# Patient Record
Sex: MALE | Race: BLACK OR AFRICAN AMERICAN | Employment: OTHER | URBAN - METROPOLITAN AREA
[De-identification: names, ages, dates, MRNs, and addresses within clinical notes are randomized per-mention and may not be internally consistent; named-entity substitution may affect disease eponyms.]

---

## 2019-01-16 ENCOUNTER — APPOINTMENT (OUTPATIENT)
Dept: LAB | Facility: HOSPITAL | Age: 49
End: 2019-01-16
Attending: INTERNAL MEDICINE
Payer: MEDICAID

## 2019-01-16 DIAGNOSIS — E11.9 TYPE 2 DIABETES MELLITUS WITHOUT COMPLICATION, WITHOUT LONG-TERM CURRENT USE OF INSULIN (HCC): ICD-10-CM

## 2019-01-16 DIAGNOSIS — I10 ESSENTIAL HYPERTENSION: ICD-10-CM

## 2019-01-16 DIAGNOSIS — Z12.5 SPECIAL SCREENING FOR MALIGNANT NEOPLASM OF PROSTATE: ICD-10-CM

## 2019-01-16 LAB
ALBUMIN SERPL BCP-MCNC: 4.4 G/DL (ref 3.5–4.8)
ALBUMIN/GLOB SERPL: 1.3 {RATIO} (ref 1–2)
ALP SERPL-CCNC: 59 U/L (ref 32–100)
ALT SERPL-CCNC: 38 U/L (ref 17–63)
ANION GAP SERPL CALC-SCNC: 10 MMOL/L (ref 0–18)
AST SERPL-CCNC: 33 U/L (ref 15–41)
BILIRUB SERPL-MCNC: 0.9 MG/DL (ref 0.3–1.2)
BUN SERPL-MCNC: 9 MG/DL (ref 8–20)
BUN/CREAT SERPL: 7.8 (ref 10–20)
CALCIUM SERPL-MCNC: 9.7 MG/DL (ref 8.5–10.5)
CHLORIDE SERPL-SCNC: 104 MMOL/L (ref 95–110)
CHOLEST SERPL-MCNC: 169 MG/DL (ref 110–200)
CO2 SERPL-SCNC: 25 MMOL/L (ref 22–32)
CREAT SERPL-MCNC: 1.15 MG/DL (ref 0.5–1.5)
EST. AVERAGE GLUCOSE BLD GHB EST-MCNC: 123 MG/DL (ref 68–126)
GLOBULIN PLAS-MCNC: 3.3 G/DL (ref 2.5–3.7)
GLUCOSE SERPL-MCNC: 122 MG/DL (ref 70–99)
HBA1C MFR BLD HPLC: 5.9 % (ref ?–5.7)
HDLC SERPL-MCNC: 38 MG/DL
LDLC SERPL CALC-MCNC: 116 MG/DL (ref 0–99)
NONHDLC SERPL-MCNC: 131 MG/DL
OSMOLALITY UR CALC.SUM OF ELEC: 288 MOSM/KG (ref 275–295)
PATIENT FASTING: YES
POTASSIUM SERPL-SCNC: 4 MMOL/L (ref 3.3–5.1)
PROT SERPL-MCNC: 7.7 G/DL (ref 5.9–8.4)
PSA SERPL-MCNC: 3.1 NG/ML (ref 0–4)
PSA SERPL-MCNC: 3.4 NG/ML (ref 0.01–4)
SODIUM SERPL-SCNC: 139 MMOL/L (ref 136–144)
TRIGL SERPL-MCNC: 77 MG/DL (ref 1–149)

## 2019-01-16 PROCEDURE — 84153 ASSAY OF PSA TOTAL: CPT

## 2019-01-16 PROCEDURE — 80053 COMPREHEN METABOLIC PANEL: CPT

## 2019-01-16 PROCEDURE — 36415 COLL VENOUS BLD VENIPUNCTURE: CPT

## 2019-01-16 PROCEDURE — 83036 HEMOGLOBIN GLYCOSYLATED A1C: CPT

## 2019-01-16 PROCEDURE — 80061 LIPID PANEL: CPT

## 2019-11-26 PROBLEM — M25.561 CHRONIC PAIN OF RIGHT KNEE: Status: ACTIVE | Noted: 2019-11-26

## 2019-11-26 PROBLEM — G89.29 CHRONIC PAIN OF RIGHT KNEE: Status: ACTIVE | Noted: 2019-11-26

## 2020-06-20 RX ORDER — ASCORBIC ACID 500 MG
500 TABLET ORAL DAILY
COMMUNITY

## 2020-06-22 ENCOUNTER — LAB ENCOUNTER (OUTPATIENT)
Dept: LAB | Facility: HOSPITAL | Age: 50
End: 2020-06-22
Attending: ORTHOPAEDIC SURGERY
Payer: MEDICAID

## 2020-06-22 DIAGNOSIS — Z01.818 PREOPERATIVE TESTING: ICD-10-CM

## 2020-06-24 ENCOUNTER — HOSPITAL ENCOUNTER (OUTPATIENT)
Facility: HOSPITAL | Age: 50
Setting detail: HOSPITAL OUTPATIENT SURGERY
Discharge: HOME | End: 2020-06-24
Attending: ORTHOPAEDIC SURGERY | Admitting: ORTHOPAEDIC SURGERY
Payer: MEDICAID

## 2020-06-24 VITALS
TEMPERATURE: 98 F | RESPIRATION RATE: 15 BRPM | DIASTOLIC BLOOD PRESSURE: 83 MMHG | WEIGHT: 240.63 LBS | OXYGEN SATURATION: 98 % | HEIGHT: 74 IN | SYSTOLIC BLOOD PRESSURE: 143 MMHG | BODY MASS INDEX: 30.88 KG/M2 | HEART RATE: 75 BPM

## 2020-06-24 DIAGNOSIS — S83.281A ACUTE LATERAL MENISCUS TEAR OF RIGHT KNEE, INITIAL ENCOUNTER: ICD-10-CM

## 2020-06-24 DIAGNOSIS — S83.241A ACUTE MEDIAL MENISCUS TEAR OF RIGHT KNEE, INITIAL ENCOUNTER: ICD-10-CM

## 2020-06-24 DIAGNOSIS — Z01.818 PREOPERATIVE TESTING: Primary | ICD-10-CM

## 2020-06-24 PROCEDURE — 82962 GLUCOSE BLOOD TEST: CPT

## 2020-06-24 RX ORDER — DEXTROSE MONOHYDRATE 25 G/50ML
50 INJECTION, SOLUTION INTRAVENOUS
Status: DISCONTINUED | OUTPATIENT
Start: 2020-06-24 | End: 2020-06-24

## 2020-06-24 RX ORDER — METOCLOPRAMIDE 10 MG/1
10 TABLET ORAL ONCE
Status: COMPLETED | OUTPATIENT
Start: 2020-06-24 | End: 2020-06-24

## 2020-06-24 RX ORDER — DEXTROSE MONOHYDRATE 50 MG/ML
INJECTION, SOLUTION INTRAVENOUS CONTINUOUS
Status: DISCONTINUED | OUTPATIENT
Start: 2020-06-24 | End: 2020-06-24

## 2020-06-24 RX ORDER — INSULIN ASPART 100 [IU]/ML
INJECTION, SOLUTION INTRAVENOUS; SUBCUTANEOUS ONCE
Status: DISCONTINUED | OUTPATIENT
Start: 2020-06-24 | End: 2020-06-24

## 2020-06-24 RX ORDER — FAMOTIDINE 20 MG/1
20 TABLET ORAL ONCE
Status: COMPLETED | OUTPATIENT
Start: 2020-06-24 | End: 2020-06-24

## 2020-06-24 RX ORDER — ACETAMINOPHEN 500 MG
1000 TABLET ORAL ONCE
Status: COMPLETED | OUTPATIENT
Start: 2020-06-24 | End: 2020-06-24

## 2020-06-24 RX ORDER — SODIUM CHLORIDE, SODIUM LACTATE, POTASSIUM CHLORIDE, CALCIUM CHLORIDE 600; 310; 30; 20 MG/100ML; MG/100ML; MG/100ML; MG/100ML
INJECTION, SOLUTION INTRAVENOUS CONTINUOUS
Status: DISCONTINUED | OUTPATIENT
Start: 2020-06-24 | End: 2020-06-24

## 2020-06-24 RX ORDER — CEFAZOLIN SODIUM/WATER 2 G/20 ML
2 SYRINGE (ML) INTRAVENOUS ONCE
Status: DISCONTINUED | OUTPATIENT
Start: 2020-06-24 | End: 2020-06-24

## 2020-06-24 NOTE — OR PREOP
BS= 258, rechecked 285. Patient states borderline diabetic, never given medications for diabetes. 08/2019 - A1C = 6.0. Dr Calderón Headings made aware. Call surgeon to see if he would like to proceed.  If so, given medium sliding scale
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
## 2020-06-27 ENCOUNTER — LAB ENCOUNTER (OUTPATIENT)
Dept: LAB | Facility: HOSPITAL | Age: 50
End: 2020-06-27
Attending: INTERNAL MEDICINE
Payer: MEDICAID

## 2020-06-27 DIAGNOSIS — Z13.220 ENCOUNTER FOR SCREENING FOR LIPID DISORDER: ICD-10-CM

## 2020-06-27 DIAGNOSIS — Z13.0 SCREENING FOR DISORDER OF BLOOD AND BLOOD-FORMING ORGANS: ICD-10-CM

## 2020-06-27 DIAGNOSIS — Z12.5 ENCOUNTER FOR SCREENING FOR MALIGNANT NEOPLASM OF PROSTATE: ICD-10-CM

## 2020-06-27 DIAGNOSIS — Z13.228 ENCOUNTER FOR SCREENING FOR METABOLIC DISORDER: ICD-10-CM

## 2020-06-27 DIAGNOSIS — Z13.29 ENCOUNTER FOR SCREENING FOR ENDOCRINE DISORDER: ICD-10-CM

## 2020-06-27 PROCEDURE — 84443 ASSAY THYROID STIM HORMONE: CPT

## 2020-06-27 PROCEDURE — 80053 COMPREHEN METABOLIC PANEL: CPT

## 2020-06-27 PROCEDURE — 36415 COLL VENOUS BLD VENIPUNCTURE: CPT

## 2020-06-27 PROCEDURE — 85025 COMPLETE CBC W/AUTO DIFF WBC: CPT

## 2020-06-27 PROCEDURE — 80061 LIPID PANEL: CPT

## 2020-06-27 PROCEDURE — 84153 ASSAY OF PSA TOTAL: CPT

## 2020-08-11 ENCOUNTER — LAB ENCOUNTER (OUTPATIENT)
Dept: LAB | Facility: HOSPITAL | Age: 50
End: 2020-08-11
Attending: ORTHOPAEDIC SURGERY
Payer: MEDICAID

## 2020-08-11 DIAGNOSIS — Z01.812 PRE-PROCEDURE LAB EXAM: Primary | ICD-10-CM

## 2020-08-11 LAB — SARS-COV-2 RNA RESP QL NAA+PROBE: NOT DETECTED

## 2020-08-12 ENCOUNTER — HOSPITAL ENCOUNTER (OUTPATIENT)
Facility: HOSPITAL | Age: 50
Setting detail: HOSPITAL OUTPATIENT SURGERY
Discharge: HOME | End: 2020-08-12
Attending: ORTHOPAEDIC SURGERY | Admitting: ORTHOPAEDIC SURGERY
Payer: MEDICAID

## 2020-08-12 ENCOUNTER — ANESTHESIA (OUTPATIENT)
Dept: SURGERY | Facility: HOSPITAL | Age: 50
End: 2020-08-12
Payer: MEDICAID

## 2020-08-12 ENCOUNTER — ANESTHESIA EVENT (OUTPATIENT)
Dept: SURGERY | Facility: HOSPITAL | Age: 50
End: 2020-08-12
Payer: MEDICAID

## 2020-08-12 VITALS
HEIGHT: 74 IN | OXYGEN SATURATION: 98 % | RESPIRATION RATE: 18 BRPM | WEIGHT: 235 LBS | SYSTOLIC BLOOD PRESSURE: 124 MMHG | HEART RATE: 71 BPM | DIASTOLIC BLOOD PRESSURE: 81 MMHG | BODY MASS INDEX: 30.16 KG/M2 | TEMPERATURE: 97 F

## 2020-08-12 DIAGNOSIS — S83.241A ACUTE MEDIAL MENISCUS TEAR OF RIGHT KNEE, INITIAL ENCOUNTER: ICD-10-CM

## 2020-08-12 DIAGNOSIS — S83.281A ACUTE LATERAL MENISCUS TEAR OF RIGHT KNEE, INITIAL ENCOUNTER: ICD-10-CM

## 2020-08-12 DIAGNOSIS — Z01.818 PRE-OP TESTING: Primary | ICD-10-CM

## 2020-08-12 LAB
GLUCOSE BLDC GLUCOMTR-MCNC: 111 MG/DL (ref 70–99)
GLUCOSE BLDC GLUCOMTR-MCNC: 114 MG/DL (ref 70–99)

## 2020-08-12 PROCEDURE — 94010 BREATHING CAPACITY TEST: CPT | Performed by: ORTHOPAEDIC SURGERY

## 2020-08-12 PROCEDURE — 82962 GLUCOSE BLOOD TEST: CPT

## 2020-08-12 PROCEDURE — 0SBC4ZZ EXCISION OF RIGHT KNEE JOINT, PERCUTANEOUS ENDOSCOPIC APPROACH: ICD-10-PCS | Performed by: ORTHOPAEDIC SURGERY

## 2020-08-12 RX ORDER — SODIUM CHLORIDE, SODIUM LACTATE, POTASSIUM CHLORIDE, CALCIUM CHLORIDE 600; 310; 30; 20 MG/100ML; MG/100ML; MG/100ML; MG/100ML
INJECTION, SOLUTION INTRAVENOUS CONTINUOUS
Status: DISCONTINUED | OUTPATIENT
Start: 2020-08-12 | End: 2020-08-12

## 2020-08-12 RX ORDER — ONDANSETRON 2 MG/ML
INJECTION INTRAMUSCULAR; INTRAVENOUS AS NEEDED
Status: DISCONTINUED | OUTPATIENT
Start: 2020-08-12 | End: 2020-08-12 | Stop reason: SURG

## 2020-08-12 RX ORDER — DEXTROSE MONOHYDRATE 25 G/50ML
50 INJECTION, SOLUTION INTRAVENOUS
Status: DISCONTINUED | OUTPATIENT
Start: 2020-08-12 | End: 2020-08-12

## 2020-08-12 RX ORDER — HYDROCODONE BITARTRATE AND ACETAMINOPHEN 5; 325 MG/1; MG/1
2 TABLET ORAL EVERY 4 HOURS PRN
Status: DISCONTINUED | OUTPATIENT
Start: 2020-08-12 | End: 2020-08-12

## 2020-08-12 RX ORDER — HYDROMORPHONE HYDROCHLORIDE 1 MG/ML
0.4 INJECTION, SOLUTION INTRAMUSCULAR; INTRAVENOUS; SUBCUTANEOUS EVERY 5 MIN PRN
Status: DISCONTINUED | OUTPATIENT
Start: 2020-08-12 | End: 2020-08-12

## 2020-08-12 RX ORDER — NALOXONE HYDROCHLORIDE 0.4 MG/ML
80 INJECTION, SOLUTION INTRAMUSCULAR; INTRAVENOUS; SUBCUTANEOUS AS NEEDED
Status: DISCONTINUED | OUTPATIENT
Start: 2020-08-12 | End: 2020-08-12

## 2020-08-12 RX ORDER — HYDROMORPHONE HYDROCHLORIDE 1 MG/ML
0.6 INJECTION, SOLUTION INTRAMUSCULAR; INTRAVENOUS; SUBCUTANEOUS EVERY 5 MIN PRN
Status: DISCONTINUED | OUTPATIENT
Start: 2020-08-12 | End: 2020-08-12

## 2020-08-12 RX ORDER — DEXAMETHASONE SODIUM PHOSPHATE 4 MG/ML
VIAL (ML) INJECTION AS NEEDED
Status: DISCONTINUED | OUTPATIENT
Start: 2020-08-12 | End: 2020-08-12 | Stop reason: SURG

## 2020-08-12 RX ORDER — HYDROCODONE BITARTRATE AND ACETAMINOPHEN 5; 325 MG/1; MG/1
2 TABLET ORAL AS NEEDED
Status: DISCONTINUED | OUTPATIENT
Start: 2020-08-12 | End: 2020-08-12

## 2020-08-12 RX ORDER — ACETAMINOPHEN 500 MG
1000 TABLET ORAL ONCE
Status: COMPLETED | OUTPATIENT
Start: 2020-08-12 | End: 2020-08-12

## 2020-08-12 RX ORDER — FAMOTIDINE 20 MG/1
20 TABLET ORAL ONCE
Status: COMPLETED | OUTPATIENT
Start: 2020-08-12 | End: 2020-08-12

## 2020-08-12 RX ORDER — MORPHINE SULFATE 10 MG/ML
6 INJECTION, SOLUTION INTRAMUSCULAR; INTRAVENOUS EVERY 10 MIN PRN
Status: DISCONTINUED | OUTPATIENT
Start: 2020-08-12 | End: 2020-08-12

## 2020-08-12 RX ORDER — CEFAZOLIN SODIUM/WATER 2 G/20 ML
2 SYRINGE (ML) INTRAVENOUS ONCE
Status: COMPLETED | OUTPATIENT
Start: 2020-08-12 | End: 2020-08-12

## 2020-08-12 RX ORDER — ONDANSETRON 2 MG/ML
4 INJECTION INTRAMUSCULAR; INTRAVENOUS ONCE AS NEEDED
Status: DISCONTINUED | OUTPATIENT
Start: 2020-08-12 | End: 2020-08-12

## 2020-08-12 RX ORDER — ONDANSETRON 2 MG/ML
4 INJECTION INTRAMUSCULAR; INTRAVENOUS EVERY 6 HOURS PRN
Status: DISCONTINUED | OUTPATIENT
Start: 2020-08-12 | End: 2020-08-12

## 2020-08-12 RX ORDER — BUPIVACAINE HYDROCHLORIDE AND EPINEPHRINE 2.5; 5 MG/ML; UG/ML
INJECTION, SOLUTION INFILTRATION; PERINEURAL AS NEEDED
Status: DISCONTINUED | OUTPATIENT
Start: 2020-08-12 | End: 2020-08-12 | Stop reason: HOSPADM

## 2020-08-12 RX ORDER — MORPHINE SULFATE 4 MG/ML
4 INJECTION, SOLUTION INTRAMUSCULAR; INTRAVENOUS EVERY 10 MIN PRN
Status: DISCONTINUED | OUTPATIENT
Start: 2020-08-12 | End: 2020-08-12

## 2020-08-12 RX ORDER — HYDROCODONE BITARTRATE AND ACETAMINOPHEN 5; 325 MG/1; MG/1
1 TABLET ORAL AS NEEDED
Status: DISCONTINUED | OUTPATIENT
Start: 2020-08-12 | End: 2020-08-12

## 2020-08-12 RX ORDER — ACETAMINOPHEN 325 MG/1
650 TABLET ORAL EVERY 4 HOURS PRN
Status: DISCONTINUED | OUTPATIENT
Start: 2020-08-12 | End: 2020-08-12

## 2020-08-12 RX ORDER — HALOPERIDOL 5 MG/ML
0.25 INJECTION INTRAMUSCULAR ONCE AS NEEDED
Status: DISCONTINUED | OUTPATIENT
Start: 2020-08-12 | End: 2020-08-12

## 2020-08-12 RX ORDER — HYDROMORPHONE HYDROCHLORIDE 1 MG/ML
0.2 INJECTION, SOLUTION INTRAMUSCULAR; INTRAVENOUS; SUBCUTANEOUS EVERY 5 MIN PRN
Status: DISCONTINUED | OUTPATIENT
Start: 2020-08-12 | End: 2020-08-12

## 2020-08-12 RX ORDER — HYDROCODONE BITARTRATE AND ACETAMINOPHEN 5; 325 MG/1; MG/1
1 TABLET ORAL EVERY 4 HOURS PRN
Status: DISCONTINUED | OUTPATIENT
Start: 2020-08-12 | End: 2020-08-12

## 2020-08-12 RX ORDER — HYDROCODONE BITARTRATE AND ACETAMINOPHEN 5; 325 MG/1; MG/1
1 TABLET ORAL EVERY 6 HOURS PRN
Qty: 10 TABLET | Refills: 0 | Status: SHIPPED | OUTPATIENT
Start: 2020-08-12 | End: 2021-03-10 | Stop reason: ALTCHOICE

## 2020-08-12 RX ORDER — MORPHINE SULFATE 4 MG/ML
2 INJECTION, SOLUTION INTRAMUSCULAR; INTRAVENOUS EVERY 10 MIN PRN
Status: DISCONTINUED | OUTPATIENT
Start: 2020-08-12 | End: 2020-08-12

## 2020-08-12 RX ORDER — ASPIRIN 325 MG
325 TABLET ORAL DAILY
Qty: 14 TABLET | Refills: 0 | Status: SHIPPED | OUTPATIENT
Start: 2020-08-12

## 2020-08-12 RX ORDER — METOCLOPRAMIDE 10 MG/1
10 TABLET ORAL ONCE
Status: COMPLETED | OUTPATIENT
Start: 2020-08-12 | End: 2020-08-12

## 2020-08-12 RX ORDER — PROCHLORPERAZINE EDISYLATE 5 MG/ML
5 INJECTION INTRAMUSCULAR; INTRAVENOUS ONCE AS NEEDED
Status: DISCONTINUED | OUTPATIENT
Start: 2020-08-12 | End: 2020-08-12

## 2020-08-12 RX ADMIN — ONDANSETRON 4 MG: 2 INJECTION INTRAMUSCULAR; INTRAVENOUS at 13:03:00

## 2020-08-12 RX ADMIN — DEXAMETHASONE SODIUM PHOSPHATE 4 MG: 4 MG/ML VIAL (ML) INJECTION at 13:05:00

## 2020-08-12 RX ADMIN — SODIUM CHLORIDE, SODIUM LACTATE, POTASSIUM CHLORIDE, CALCIUM CHLORIDE: 600; 310; 30; 20 INJECTION, SOLUTION INTRAVENOUS at 13:20:00

## 2020-08-12 RX ADMIN — CEFAZOLIN SODIUM/WATER 2 G: 2 G/20 ML SYRINGE (ML) INTRAVENOUS at 12:43:00

## 2020-08-12 NOTE — ANESTHESIA POSTPROCEDURE EVALUATION
Patient: Gregg Fragoso    Procedure Summary     Date:  08/12/20 Room / Location:  [facility] OR  / [facility] OR    Anesthesia Start:  8818 Anesthesia Stop:  8159    Procedure:  KNEE ARTHROSCOPY (Right Knee) Diagnosis:       Acute medial meniscus tear

## 2020-08-12 NOTE — ANESTHESIA PROCEDURE NOTES
Airway  Urgency: Elective      General Information and Staff    Patient location during procedure: OR  Anesthesiologist: Xochitl Bueno MD  Performed: anesthesiologist     Indications and Patient Condition  Indications for airway management: anesthesia

## 2020-08-12 NOTE — H&P
Kel Leach MD   Internal Medicine   Type 1 [facility] mellitus [facility] +4 more   Dx   Pre-Op Exam     Reason for Visit           Reason for Visit     Pre-Op Exam right knee scope   Progress Notes           HPI:      Patient pr • cloNIDine HCl 0.1 MG Oral Tab Take 1 tablet (0.1 mg total) by mouth daily.  90 tablet 3   • VENTOLIN  (90 Base) MCG/ACT Inhalation Aero Soln INHALE 1-2 PUFFS BY MOUTH ONCE EVERY 6 HOURS AS NEEDED FOR WHEEZING OR SHORTNESS OF BREATH 18 g 0   • Irbes HEENT: denies congestion, sore throat, swallowing difficulties  LUNGS: denies resting nor exertional dyspnea, wheezing, cough  CARDIOVASCULAR: denies chest pain, palpitations, orthopnea/ PND.   GI: denies nausea nor vomiting, abdominal pain, altered bowel h Abnormal EKG with left atrial enlargement. None noted on 2014 echo. No need for further testing.     Hypertension, controlled. Take BP pills morning of surgery with sip of H2O.     This consult was sent back the referring physician, Dr. Risa Craig.

## 2020-08-12 NOTE — ANESTHESIA PREPROCEDURE EVALUATION
Anesthesia PreOp Note    HPI:     Kaylynn Pena is a 45year old male who presents for preoperative consultation requested by: Boni Eubanks MD    Date of Surgery: 8/12/2020    Procedure(s):  KNEE ARTHROSCOPY  Indication: Acute medial meniscu 8/12/2020 at Unknown time  Glucose Blood (ONETOUCH ULTRA) In Vitro Strip, Test Blood Sugar Three Times Daily.  Insulin Dependent Diabetes Type II. Z79.4, E11.9., Disp: 300 each, Rfl: 3, 8/12/2020 at Unknown time  metFORMIN HCl 500 MG Oral Tab, Take 1 tablet a month at Unknown time  Montelukast Sodium (SINGULAIR) 10 MG Oral Tab, Take 1 tablet (10 mg total) by mouth daily. , Disp: 30 tablet, Rfl: 12, Unknown at Unknown time  FLUTICASONE PROPIONATE 50 MCG/ACT Nasal Suspension, USE 2 SPRAYS IN EACH NOSTRIL DAILY, Fear of current or ex partner: Not on file        Emotionally abused: Not on file        Physically abused: Not on file        Forced sexual activity: Not on file    Other Topics      Concerns:        Not on file    Social History Narrative      Not on josette member of the nature of the anesthetic plan, benefits, risks including possible dental damage if relevant, major complications, and any alternative forms of anesthetic management. All of the patient's questions were answered to the best of my ability.  Joselyn Harrington

## 2020-08-12 NOTE — BRIEF OP NOTE
Pre-Operative Diagnosis: Acute medial meniscus tear of right knee, initial encounter [S83.241A]  Acute lateral meniscus tear of right knee, initial encounter [S83.281A]     Post-Operative Diagnosis: Acute medial meniscus tear of right knee, initial encount

## 2020-08-12 NOTE — OPERATIVE REPORT
[facility]    PATIENT'S NAME: DEMETRIA IRENA LARSON   ATTENDING PHYSICIAN: Deepti Brooks MD   OPERATING PHYSICIAN: Deepti Brooks MD   PATIENT ACCOUNT#:   [id number]    LOCATION:  [address]  MEDICAL RECORD #:   [id number] the lateral aspect of the trochlea. The patient also had no loose bodies in the medial or lateral gutter. The medial compartment was entered and there was a degenerative tear of the posterior horn and body of the medial meniscus.   This was debrided back

## 2020-10-02 NOTE — LETTER
AUTHORIZATION FOR SURGICAL OPERATION OR OTHER PROCEDURE    1. I hereby authorize SIOMARA Nichole, and [facility] staff assigned to my case to perform the following operation and/or procedure at the [facility]:    ___________Cortisone Injection Left knee______________________________      _______________________________________________________________________________________________    2. My physician has explained the nature and purpose of the operation or other procedure, possible alternative methods of treatment, the risks involved, and the possibility of complication to me. I acknowledge that no guarantee has been made as to the result that may be obtained. 3.  I recognize that, during the course of this operation, or other procedure, unforseen conditions may necessitate additional or different procedure than those listed above. I, therefore, further authorize and request that the above named physician, his/her physician assistants or designees perform such procedures as are, in his/her professional opinion, necessary and desirable. 4.  Any tissue or organs removed in the operation or other procedure may be disposed of by and at the discretion of the [facility] and [facility]. 5.  I understand that in the event of a medical emergency, I will be transported by local paramedics to [facility] or other hospital emergency department. 6.  I certify that I have read and fully understand the above consent to operation and/or other procedure. 7.  I acknowledge that my physician has explained sedation/analgesia administration to me including the risks and benefits. I consent to the administration of sedation/analgesia as may be necessary or desirable in the judgement of my physician. Witness signature: ___________________________________________________ Date:  ______/______/_____                    Time:  ________ A. M.  P.M.        Patient Name:  ______________________________________________________  (please print)      Patient signature:  ___________________________________________________             Relationship to Patient:           []  Parent    Responsible person                          []  Spouse  In case of minor or                    [] Other  _____________   Incompetent name:  __________________________________________________                               (please print)      _____________      Responsible person  In case of minor or  Incompetent signature:  _______________________________________________    Statement of Physician  My signature below affirms that prior to the time of the procedure, I have explained to the patient and/or his/her guardian, the risks and benefits involved in the proposed treatment and any reasonable alternative to the proposed treatment. I have also explained the risks and benefits involved in the refusal of the proposed treatment and have answered the patient's questions.                         Date:  ______/______/_______  Provider                      Signature:  __________________________________________________________       Time:  ___________ A.M    P.M. done

## 2021-01-04 PROBLEM — M75.42 IMPINGEMENT SYNDROME OF LEFT SHOULDER: Status: ACTIVE | Noted: 2021-01-04

## 2021-03-10 ENCOUNTER — OFFICE VISIT (OUTPATIENT)
Dept: ORTHOPEDICS CLINIC | Facility: CLINIC | Age: 51
End: 2021-03-10
Payer: MEDICAID

## 2021-03-10 ENCOUNTER — HOSPITAL ENCOUNTER (OUTPATIENT)
Dept: GENERAL RADIOLOGY | Facility: HOSPITAL | Age: 51
Discharge: HOME | End: 2021-03-10
Attending: ORTHOPAEDIC SURGERY
Payer: MEDICAID

## 2021-03-10 VITALS
WEIGHT: 235 LBS | RESPIRATION RATE: 16 BRPM | SYSTOLIC BLOOD PRESSURE: 137 MMHG | DIASTOLIC BLOOD PRESSURE: 85 MMHG | HEART RATE: 73 BPM | BODY MASS INDEX: 30.16 KG/M2 | HEIGHT: 74 IN

## 2021-03-10 DIAGNOSIS — M17.12 PRIMARY OSTEOARTHRITIS OF LEFT KNEE: ICD-10-CM

## 2021-03-10 DIAGNOSIS — R52 PAIN: ICD-10-CM

## 2021-03-10 DIAGNOSIS — R52 PAIN: Primary | ICD-10-CM

## 2021-03-10 PROCEDURE — 3075F SYST BP GE 130 - 139MM HG: CPT | Performed by: ORTHOPAEDIC SURGERY

## 2021-03-10 PROCEDURE — 73564 X-RAY EXAM KNEE 4 OR MORE: CPT | Performed by: ORTHOPAEDIC SURGERY

## 2021-03-10 PROCEDURE — 3008F BODY MASS INDEX DOCD: CPT | Performed by: ORTHOPAEDIC SURGERY

## 2021-03-10 PROCEDURE — 20610 DRAIN/INJ JOINT/BURSA W/O US: CPT | Performed by: ORTHOPAEDIC SURGERY

## 2021-03-10 PROCEDURE — 99244 OFF/OP CNSLTJ NEW/EST MOD 40: CPT | Performed by: ORTHOPAEDIC SURGERY

## 2021-03-10 PROCEDURE — 3079F DIAST BP 80-89 MM HG: CPT | Performed by: ORTHOPAEDIC SURGERY

## 2021-03-10 RX ORDER — MELOXICAM 15 MG/1
15 TABLET ORAL DAILY
Qty: 30 TABLET | Refills: 0 | Status: SHIPPED | OUTPATIENT
Start: 2021-03-10 | End: 2021-12-06

## 2021-03-10 RX ORDER — TRIAMCINOLONE ACETONIDE 40 MG/ML
40 INJECTION, SUSPENSION INTRA-ARTICULAR; INTRAMUSCULAR ONCE
Status: COMPLETED | OUTPATIENT
Start: 2021-03-10 | End: 2021-03-10

## 2021-03-10 RX ADMIN — TRIAMCINOLONE ACETONIDE 40 MG: 40 INJECTION, SUSPENSION INTRA-ARTICULAR; INTRAMUSCULAR at 12:00:00

## 2021-03-10 NOTE — PROGRESS NOTES
NURSING INTAKE COMMENTS: Patient presents with:  Knee Pain: Right- pt states he had Right knee arthroscopy on 8/12/20 by Dr. Margarita Roy and has had an issue with swelling since. pt rates his pain 5/10, intermittent.       HPI: This 45year old male presents to Does not apply Misc Test Blood Sugar Three Times Daily. Insulin Dependent Diabetes Type II. Z79.4, E11.9. 300 each 3   • Blood Glucose Monitoring Suppl (ONETOUCH ULTRA 2) w/Device Does not apply Kit Test Blood Sugar Three Times Daily.  Insulin Dependent Demi file    Tobacco Use      Smoking status: Never Smoker      Smokeless tobacco: Never Used    Vaping Use      Vaping Use: Never used    Substance and Sexual Activity      Alcohol use: Not Currently      Drug use: No      Sexual activity: Not on file       Re injection 40 mg    Other orders  -     Meloxicam 15 MG Oral Tab; Take 1 tablet (15 mg total) by mouth daily. Assessment/plan: He has advanced osteoarthritis of the right knee with bone-on-bone in the medial compartment.   I reviewed surgical and nons

## 2021-03-10 NOTE — PROGRESS NOTES
Per verbal order from Dr. Tarun Phillip, draw up and 4ml of 0.5% Marcaine and 1ml of Kenalog 40 for injection into right knee. Lazaro Aguilar RN  Patient provided education handout for cortisone injection. Patient left office prior to obtaining post injection vitals.

## 2021-04-16 ENCOUNTER — PATIENT MESSAGE (OUTPATIENT)
Dept: ORTHOPEDICS CLINIC | Facility: CLINIC | Age: 51
End: 2021-04-16

## 2021-04-16 DIAGNOSIS — M17.11 PRIMARY OSTEOARTHRITIS OF RIGHT KNEE: Primary | ICD-10-CM

## 2021-04-20 NOTE — TELEPHONE ENCOUNTER
Virginie has been approved by [facility]. Authorization number [id number] is valid until 8/18/2021. Okay to schedule patient.

## 2021-04-21 ENCOUNTER — LAB ENCOUNTER (OUTPATIENT)
Dept: LAB | Facility: HOSPITAL | Age: 51
End: 2021-04-21
Attending: INTERNAL MEDICINE
Payer: MEDICAID

## 2021-04-21 DIAGNOSIS — Z01.818 PRE-OP TESTING: ICD-10-CM

## 2021-04-23 ENCOUNTER — HOSPITAL ENCOUNTER (OUTPATIENT)
Facility: HOSPITAL | Age: 51
Setting detail: HOSPITAL OUTPATIENT SURGERY
Discharge: HOME | End: 2021-04-23
Attending: INTERNAL MEDICINE | Admitting: INTERNAL MEDICINE
Payer: MEDICAID

## 2021-04-23 ENCOUNTER — ANESTHESIA EVENT (OUTPATIENT)
Dept: ENDOSCOPY | Facility: HOSPITAL | Age: 51
End: 2021-04-23
Payer: MEDICAID

## 2021-04-23 ENCOUNTER — ANESTHESIA (OUTPATIENT)
Dept: ENDOSCOPY | Facility: HOSPITAL | Age: 51
End: 2021-04-23
Payer: MEDICAID

## 2021-04-23 VITALS
DIASTOLIC BLOOD PRESSURE: 68 MMHG | HEIGHT: 74 IN | RESPIRATION RATE: 15 BRPM | BODY MASS INDEX: 30.16 KG/M2 | WEIGHT: 235 LBS | HEART RATE: 84 BPM | OXYGEN SATURATION: 96 % | SYSTOLIC BLOOD PRESSURE: 107 MMHG | TEMPERATURE: 98 F

## 2021-04-23 DIAGNOSIS — Z01.818 PRE-OP TESTING: Primary | ICD-10-CM

## 2021-04-23 DIAGNOSIS — Z80.0 FAMILY HISTORY OF MALIGNANT NEOPLASM OF GASTROINTESTINAL TRACT: ICD-10-CM

## 2021-04-23 DIAGNOSIS — Z12.11 SPECIAL SCREENING FOR MALIGNANT NEOPLASMS, COLON: ICD-10-CM

## 2021-04-23 PROCEDURE — 0DJD8ZZ INSPECTION OF LOWER INTESTINAL TRACT, VIA NATURAL OR ARTIFICIAL OPENING ENDOSCOPIC: ICD-10-PCS | Performed by: INTERNAL MEDICINE

## 2021-04-23 PROCEDURE — 82962 GLUCOSE BLOOD TEST: CPT

## 2021-04-23 RX ORDER — SODIUM CHLORIDE, SODIUM LACTATE, POTASSIUM CHLORIDE, CALCIUM CHLORIDE 600; 310; 30; 20 MG/100ML; MG/100ML; MG/100ML; MG/100ML
INJECTION, SOLUTION INTRAVENOUS CONTINUOUS
Status: DISCONTINUED | OUTPATIENT
Start: 2021-04-23 | End: 2021-04-23

## 2021-04-23 RX ORDER — SODIUM CHLORIDE 0.9 % (FLUSH) 0.9 %
10 SYRINGE (ML) INJECTION AS NEEDED
Status: DISCONTINUED | OUTPATIENT
Start: 2021-04-23 | End: 2021-04-23

## 2021-04-23 RX ORDER — MIDAZOLAM HYDROCHLORIDE 1 MG/ML
1 INJECTION INTRAMUSCULAR; INTRAVENOUS EVERY 5 MIN PRN
Status: DISCONTINUED | OUTPATIENT
Start: 2021-04-23 | End: 2021-04-23

## 2021-04-23 RX ORDER — SODIUM CHLORIDE, SODIUM LACTATE, POTASSIUM CHLORIDE, CALCIUM CHLORIDE 600; 310; 30; 20 MG/100ML; MG/100ML; MG/100ML; MG/100ML
INJECTION, SOLUTION INTRAVENOUS CONTINUOUS PRN
Status: DISCONTINUED | OUTPATIENT
Start: 2021-04-23 | End: 2021-04-23 | Stop reason: SURG

## 2021-04-23 RX ORDER — LIDOCAINE HYDROCHLORIDE 10 MG/ML
INJECTION, SOLUTION EPIDURAL; INFILTRATION; INTRACAUDAL; PERINEURAL AS NEEDED
Status: DISCONTINUED | OUTPATIENT
Start: 2021-04-23 | End: 2021-04-23 | Stop reason: SURG

## 2021-04-23 RX ADMIN — SODIUM CHLORIDE, SODIUM LACTATE, POTASSIUM CHLORIDE, CALCIUM CHLORIDE: 600; 310; 30; 20 INJECTION, SOLUTION INTRAVENOUS at 12:30:00

## 2021-04-23 RX ADMIN — LIDOCAINE HYDROCHLORIDE 25 MG: 10 INJECTION, SOLUTION EPIDURAL; INFILTRATION; INTRACAUDAL; PERINEURAL at 12:33:00

## 2021-04-23 RX ADMIN — SODIUM CHLORIDE, SODIUM LACTATE, POTASSIUM CHLORIDE, CALCIUM CHLORIDE: 600; 310; 30; 20 INJECTION, SOLUTION INTRAVENOUS at 12:43:00

## 2021-04-23 NOTE — ANESTHESIA PREPROCEDURE EVALUATION
Anesthesia PreOp Note    HPI:     Grazyna Bone is a 45year old male who presents for preoperative consultation requested by: Majo Madrigal MD    Date of Surgery: 4/23/2021    Procedure(s):  COLONOSCOPY  Indication: Special screening for malign 3  metFORMIN HCl 500 MG Oral Tab, Take 1 tablet (500 mg total) by mouth 2 (two) times daily. , Disp: 180 tablet, Rfl: 3  Vitamin C 500 MG Oral Tab, Take 500 mg by mouth daily. , Disp: , Rfl:   Cod Liver Oil 1000 MG Oral Cap, Take 1 capsule by mouth daily. , D ringers infusion, , Intravenous, Continuous, Corinne Shire, MD    No current [facility]-ordered outpatient medications on file.       No Known Allergies    Family History   Problem Relation Age of Onset   • Hypertension Father    • Other (Other) Mother were no vitals filed for this visit.      Anesthesia Evaluation     Patient summary reviewed and Nursing notes reviewed    Airway   Mallampati: II  TM distance: >3 FB  Neck ROM: full  Dental      Comment: Obvious signs of disease    Pulmonary - normal exam

## 2021-04-23 NOTE — OPERATIVE REPORT
COLONOSCOPY REPORT    Patient Name:  Shauna Zepeda Record #: [id number]  YOB: 1970  Date of Procedure: 4/23/2021    Referring physician: Lucia Nicolas MD    Surgeon:  Keagan Mcqueen.  Lindsay Goncalves MD    Pre-op diagnosis: Colon cancer lilibeth

## 2021-04-23 NOTE — ANESTHESIA POSTPROCEDURE EVALUATION
Patient: Jillian Swann    Procedure Summary     Date: 04/23/21 Room / Location: [facility] ENDOSCOPY 04 / [facility] ENDOSCOPY    Anesthesia Start: 1425 Anesthesia Stop: 5775    Procedure: COLONOSCOPY (N/A ) Diagnosis:       Special screening for malignant neopla

## 2021-04-23 NOTE — H&P
RE-PROCEDURE UPDATE    HPI: Agueda Diaz is a 45year old male. 7/7/1970. Patient presents for a colonoscopy. ALLERGIES: No Known Allergies    No current outpatient medications on file.      Past Medical History:   Diagnosis Date   • Asthma

## 2021-05-05 ENCOUNTER — TELEPHONE (OUTPATIENT)
Dept: ORTHOPEDICS CLINIC | Facility: CLINIC | Age: 51
End: 2021-05-05

## 2021-05-05 NOTE — TELEPHONE ENCOUNTER
Spoke with patient questions answered regarding injections. Please contact patient to set up an appointment for him to get his first injection.

## 2021-05-05 NOTE — TELEPHONE ENCOUNTER
Per pt received message that he has been approved for gel injection.  Per pt has swelling in his knee and asking if it can be drained on the same day or does he need to have it drained first.  Per pt how long after the injection does he need to stay off his

## 2021-06-02 ENCOUNTER — OFFICE VISIT (OUTPATIENT)
Dept: ORTHOPEDICS CLINIC | Facility: CLINIC | Age: 51
End: 2021-06-02
Payer: MEDICAID

## 2021-06-02 VITALS — SYSTOLIC BLOOD PRESSURE: 131 MMHG | DIASTOLIC BLOOD PRESSURE: 83 MMHG | HEART RATE: 71 BPM

## 2021-06-02 DIAGNOSIS — M17.11 PRIMARY OSTEOARTHRITIS OF RIGHT KNEE: Primary | ICD-10-CM

## 2021-06-02 PROCEDURE — 3079F DIAST BP 80-89 MM HG: CPT | Performed by: PHYSICIAN ASSISTANT

## 2021-06-02 PROCEDURE — 3075F SYST BP GE 130 - 139MM HG: CPT | Performed by: PHYSICIAN ASSISTANT

## 2021-06-02 PROCEDURE — 20610 DRAIN/INJ JOINT/BURSA W/O US: CPT | Performed by: PHYSICIAN ASSISTANT

## 2021-06-02 NOTE — PROGRESS NOTES
NURSING INTAKE COMMENTS: Patient presents with: Injection: Durolane injection to right knee- denies any pain at this office visit      HPI: This 45year old male presents today for follow-up on his right knee. He is here for a Durolane injection.   She ha Delica Lancets 01Z Does not apply Misc Test Blood Sugar Three Times Daily. Insulin Dependent Diabetes Type II. Z79.4, E11.9. 300 each 3   • Blood Glucose Monitoring Suppl (ONETOUCH ULTRA 2) w/Device Does not apply Kit Test Blood Sugar Three Times Daily.  In Drug use: No      Sexual activity: Not on file       Review of Systems:  GENERAL: feels generally well, no significant weight loss or weight gain  SKIN: no ulcerated or worrisome skin lesions  EYES:denies blurred vision or double vision  HEENT: denies new medical decision-making.

## 2022-04-12 ENCOUNTER — PATIENT MESSAGE (OUTPATIENT)
Dept: ORTHOPEDICS CLINIC | Facility: CLINIC | Age: 52
End: 2022-04-12

## 2022-04-29 ENCOUNTER — OFFICE VISIT (OUTPATIENT)
Dept: ORTHOPEDICS CLINIC | Facility: CLINIC | Age: 52
End: 2022-04-29
Payer: MEDICAID

## 2022-04-29 VITALS — HEART RATE: 92 BPM | SYSTOLIC BLOOD PRESSURE: 142 MMHG | DIASTOLIC BLOOD PRESSURE: 80 MMHG

## 2022-04-29 DIAGNOSIS — M17.11 PRIMARY OSTEOARTHRITIS OF RIGHT KNEE: Primary | ICD-10-CM

## 2022-04-29 PROCEDURE — 3079F DIAST BP 80-89 MM HG: CPT | Performed by: ORTHOPAEDIC SURGERY

## 2022-04-29 PROCEDURE — 99213 OFFICE O/P EST LOW 20 MIN: CPT | Performed by: ORTHOPAEDIC SURGERY

## 2022-04-29 PROCEDURE — 20610 DRAIN/INJ JOINT/BURSA W/O US: CPT | Performed by: PHYSICIAN ASSISTANT

## 2022-04-29 PROCEDURE — 3077F SYST BP >= 140 MM HG: CPT | Performed by: ORTHOPAEDIC SURGERY

## 2022-11-09 ENCOUNTER — APPOINTMENT (OUTPATIENT)
Dept: ULTRASOUND IMAGING | Facility: HOSPITAL | Age: 52
End: 2022-11-09
Attending: NURSE PRACTITIONER
Payer: MEDICAID

## 2022-11-09 ENCOUNTER — HOSPITAL ENCOUNTER (EMERGENCY)
Facility: HOSPITAL | Age: 52
Discharge: HOME | End: 2022-11-09
Payer: MEDICAID

## 2022-11-09 ENCOUNTER — APPOINTMENT (OUTPATIENT)
Dept: GENERAL RADIOLOGY | Facility: HOSPITAL | Age: 52
End: 2022-11-09
Payer: MEDICAID

## 2022-11-09 ENCOUNTER — TELEPHONE (OUTPATIENT)
Dept: ORTHOPEDICS CLINIC | Facility: CLINIC | Age: 52
End: 2022-11-09

## 2022-11-09 VITALS
SYSTOLIC BLOOD PRESSURE: 161 MMHG | OXYGEN SATURATION: 98 % | HEART RATE: 74 BPM | RESPIRATION RATE: 16 BRPM | DIASTOLIC BLOOD PRESSURE: 96 MMHG | TEMPERATURE: 99 F

## 2022-11-09 DIAGNOSIS — M25.562 ACUTE PAIN OF LEFT KNEE: Primary | ICD-10-CM

## 2022-11-09 PROCEDURE — 99284 EMERGENCY DEPT VISIT MOD MDM: CPT

## 2022-11-09 PROCEDURE — 93971 EXTREMITY STUDY: CPT | Performed by: NURSE PRACTITIONER

## 2022-11-09 PROCEDURE — 73560 X-RAY EXAM OF KNEE 1 OR 2: CPT

## 2022-11-09 RX ORDER — HYDROCODONE BITARTRATE AND ACETAMINOPHEN 5; 325 MG/1; MG/1
1 TABLET ORAL EVERY 8 HOURS PRN
Qty: 10 TABLET | Refills: 0 | Status: SHIPPED | OUTPATIENT
Start: 2022-11-09 | End: 2022-11-12

## 2022-11-09 NOTE — TELEPHONE ENCOUNTER
Per pt believes he tore his meniscus and has an appointment 12/12 and asking if he can be seen sooner.  Please advise

## 2022-11-09 NOTE — ED INITIAL ASSESSMENT (HPI)
Pt to ED for left knee pain since yesterday, states, \"I think I tore my meniscus. \" When asked if there was any trauma to the knee, pt said, \"I don't know, you tell me. \"

## 2022-11-09 NOTE — TELEPHONE ENCOUNTER
12/12/2022 is okay, but you can offer him an earlier appointment if there is a cancellation and a spot opens up sooner.

## 2022-11-09 NOTE — TELEPHONE ENCOUNTER
Patient reports feeling he may have torn left knee meniscus. States has been having pain on left knee for a couple weeks, but last night pain reached a 10/10. Reports pain is a \"12/10\" at this time. Notes swelling in the inner left side of the knee. States started limping yesterday and is becoming harder to bear weight on his left knee. States he had a right knee meniscus tear and feels exactly the same. Denies any numbness or tingling, calf pain, recent falls or injuries. Patient has an appointment on 12/12/22 and requesting a sooner appointment. Patient was advised to report to ER due to gracy pain level, swelling and decreased ability to bear weight on leg. Patient verbalized understanding and agreement. Please advise if any further recommendations and or if you would like to see patient sooner than 12/12. Thank you.

## 2022-11-10 ENCOUNTER — TELEPHONE (OUTPATIENT)
Dept: ORTHOPEDICS CLINIC | Facility: CLINIC | Age: 52
End: 2022-11-10

## 2022-11-10 NOTE — TELEPHONE ENCOUNTER
Patient requesting ER follow up. States he was told to follow up ASAP. NO appointments available.  Please advise

## 2022-11-11 NOTE — TELEPHONE ENCOUNTER
S/w pt and he states knee pain started on 11/8. No injury. Pt went to UC on 11/9 and had XR. Pt rates pain 7/10. appt made on 11/15 @ 3pm with Dr Nydia Horowitz, advised there may be a wait.

## 2022-11-15 ENCOUNTER — OFFICE VISIT (OUTPATIENT)
Dept: ORTHOPEDICS CLINIC | Facility: CLINIC | Age: 52
End: 2022-11-15
Payer: MEDICAID

## 2022-11-15 ENCOUNTER — TELEPHONE (OUTPATIENT)
Dept: ORTHOPEDICS CLINIC | Facility: CLINIC | Age: 52
End: 2022-11-15

## 2022-11-15 VITALS
BODY MASS INDEX: 30.16 KG/M2 | HEART RATE: 77 BPM | HEIGHT: 74 IN | WEIGHT: 235 LBS | DIASTOLIC BLOOD PRESSURE: 94 MMHG | SYSTOLIC BLOOD PRESSURE: 169 MMHG

## 2022-11-15 DIAGNOSIS — M25.562 ACUTE PAIN OF LEFT KNEE: ICD-10-CM

## 2022-11-15 DIAGNOSIS — M23.92 ACUTE INTERNAL DERANGEMENT OF LEFT KNEE: Primary | ICD-10-CM

## 2022-11-15 DIAGNOSIS — M17.12 PRIMARY OSTEOARTHRITIS OF LEFT KNEE: ICD-10-CM

## 2022-11-15 DIAGNOSIS — M17.11 PRIMARY OSTEOARTHRITIS OF RIGHT KNEE: Primary | ICD-10-CM

## 2022-11-15 PROCEDURE — 3077F SYST BP >= 140 MM HG: CPT

## 2022-11-15 PROCEDURE — 3080F DIAST BP >= 90 MM HG: CPT

## 2022-11-15 PROCEDURE — 20610 DRAIN/INJ JOINT/BURSA W/O US: CPT

## 2022-11-15 PROCEDURE — 3008F BODY MASS INDEX DOCD: CPT

## 2022-11-15 PROCEDURE — 99244 OFF/OP CNSLTJ NEW/EST MOD 40: CPT

## 2022-11-15 RX ORDER — HYALURONATE SODIUM, STABILIZED 60 MG/3 ML
60 SYRINGE (ML) INTRAARTICULAR
COMMUNITY
Start: 2022-04-29 | End: 2022-11-15 | Stop reason: ALTCHOICE

## 2022-11-15 RX ORDER — TRIAMCINOLONE ACETONIDE 40 MG/ML
40 INJECTION, SUSPENSION INTRA-ARTICULAR; INTRAMUSCULAR ONCE
Status: COMPLETED | OUTPATIENT
Start: 2022-11-15 | End: 2022-11-15

## 2022-11-15 RX ADMIN — TRIAMCINOLONE ACETONIDE 40 MG: 40 INJECTION, SUSPENSION INTRA-ARTICULAR; INTRAMUSCULAR at 15:38:00

## 2022-11-15 NOTE — PROGRESS NOTES
Per verbal order from SIOMARA Houser, draw up 5ml of 0.5% Marcaine and 1ml of Kenalog 40 for cortisone injection to left knee. Ai Robles RN  Patient provided education handout for cortisone injection.

## 2022-11-15 NOTE — TELEPHONE ENCOUNTER
Pt requesting Durolane injection right knee.  Last durolane given on 4/29/22  Please sign pending order to submit for approval Doxepin Pregnancy And Lactation Text: This medication is Pregnancy Category C and it isn't known if it is safe during pregnancy. It is also excreted in breast milk and breast feeding isn't recommended.

## 2022-11-21 NOTE — TELEPHONE ENCOUNTER
Repeat Durolane injection approved via [employer].. Auth # [id number] is valid until 5/20/2023. Okay to schedule.

## 2022-12-12 ENCOUNTER — OFFICE VISIT (OUTPATIENT)
Dept: ORTHOPEDICS CLINIC | Facility: CLINIC | Age: 52
End: 2022-12-12
Payer: MEDICAID

## 2022-12-12 VITALS — HEART RATE: 78 BPM | SYSTOLIC BLOOD PRESSURE: 149 MMHG | DIASTOLIC BLOOD PRESSURE: 84 MMHG

## 2022-12-12 DIAGNOSIS — M17.11 PRIMARY OSTEOARTHRITIS OF RIGHT KNEE: Primary | ICD-10-CM

## 2023-01-11 ENCOUNTER — HOSPITAL ENCOUNTER (EMERGENCY)
Facility: HOSPITAL | Age: 53
Discharge: HOME | End: 2023-01-11
Attending: EMERGENCY MEDICINE
Payer: MEDICAID

## 2023-01-11 ENCOUNTER — APPOINTMENT (OUTPATIENT)
Dept: CT IMAGING | Facility: HOSPITAL | Age: 53
End: 2023-01-11
Attending: EMERGENCY MEDICINE
Payer: MEDICAID

## 2023-01-11 VITALS
HEART RATE: 77 BPM | SYSTOLIC BLOOD PRESSURE: 159 MMHG | RESPIRATION RATE: 18 BRPM | TEMPERATURE: 98 F | BODY MASS INDEX: 29.52 KG/M2 | OXYGEN SATURATION: 98 % | DIASTOLIC BLOOD PRESSURE: 94 MMHG | WEIGHT: 230 LBS | HEIGHT: 74 IN

## 2023-01-11 DIAGNOSIS — J32.0 MAXILLARY SINUSITIS, UNSPECIFIED CHRONICITY: ICD-10-CM

## 2023-01-11 DIAGNOSIS — S09.8XXA BLUNT HEAD TRAUMA, INITIAL ENCOUNTER: Primary | ICD-10-CM

## 2023-01-11 DIAGNOSIS — I10 HYPERTENSION, UNSPECIFIED TYPE: ICD-10-CM

## 2023-01-11 PROCEDURE — 99284 EMERGENCY DEPT VISIT MOD MDM: CPT

## 2023-01-11 PROCEDURE — 70450 CT HEAD/BRAIN W/O DYE: CPT | Performed by: EMERGENCY MEDICINE
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
## 2023-02-07 ENCOUNTER — OFFICE VISIT (OUTPATIENT)
Dept: PHYSICAL THERAPY | Age: 53
End: 2023-02-07
Payer: MEDICAID

## 2023-02-07 DIAGNOSIS — M25.562 ACUTE PAIN OF LEFT KNEE: ICD-10-CM

## 2023-02-07 DIAGNOSIS — M23.92 ACUTE INTERNAL DERANGEMENT OF LEFT KNEE: ICD-10-CM

## 2023-02-07 DIAGNOSIS — M17.12 PRIMARY OSTEOARTHRITIS OF LEFT KNEE: ICD-10-CM

## 2023-02-07 PROCEDURE — 97110 THERAPEUTIC EXERCISES: CPT

## 2023-02-07 PROCEDURE — 97162 PT EVAL MOD COMPLEX 30 MIN: CPT

## 2023-02-09 ENCOUNTER — OFFICE VISIT (OUTPATIENT)
Dept: PHYSICAL THERAPY | Age: 53
End: 2023-02-09
Payer: MEDICAID

## 2023-02-09 PROCEDURE — 97140 MANUAL THERAPY 1/> REGIONS: CPT

## 2023-02-09 PROCEDURE — 97110 THERAPEUTIC EXERCISES: CPT

## 2023-02-09 NOTE — PROGRESS NOTES
Diagnosis:   Primary osteoarthritis of left knee (M17.12)  Acute pain of left knee (M25.562)  Acute internal derangement of left knee (M23.92)   Referring Provider: Vince Rainey  Date of Evaluation:    2/7/2023    Precautions:  None Next MD visit:   none scheduled  Date of Surgery: n/a   Insurance Primary/Secondary: Jodee Salamanca / N/A     # Auth Visits: POC 13 visits, 13 visits auth until 4/8/2023      Subjective: The patient reports his knee feels swollen today. He thinks the swelling could be due to the weather. He is ambulating with a cane today to provide support in case his knee casey. It has not buckled today but it has a history of buckling. He did not ice today. Yesterday he had very little pain at 4/10. Pain: 12/10  Current functional limitations include intermittent pain with sleeping,, standing less than 10 mins, walking more than 2 blocks, bending down, intermittent difficulty with stairs (step to step),  difficulty getting in/out sedan cars     Objective: See treatment log   Palpation: Mod edema across medial joint line of L knee       Assessment: The patient is tender to palpation along the medial knee joint line potentially due to the mod edema today. He reports stretching pain in quads and hamstrings with exercises. He required frequent breaks and was bending his knee to help decrease stiffness after extension exercises. He reports 12/10 pain with all exercises, patellar mobs and STM attempted for pain relief. Today's session focused on strength and mobility by progressing with straight leg raises and knee slides to improve mobility into flexion. He continues to demonstrate limitations with strength and exercise tolerance due to pain and edema. The patient will benefit from continuation of physical therapy services with focus on decreasing pain and improving function with  walking and standing activities.      Goals:   Goals: (to be met in 13 visits)  Patient is independent with progressive HEP to maintain gains made in PT   Patient is able to decrease max pain to 6/10 to decrease limitations with walking and standing  Patient is able to sleep 75% of the nights without knee pain to improve quality of life   Patient is able to improve L hip flexion MMT to 4+/5 to improve strength with walking and standing  Patient is able to reach 15s SLS stance on LLE to improve balance on uneven grounds   Patient is able to able to improve knee flexion range to 120 degrees to improve mobility with bending to  items from the ground    Plan: Attempt standing exercises to improve strength by adding standing heel raises and slant stretch to improve weight bearing support of the knee.    Date: 2/9/2023  TX#: 2/13 Date:                 TX#: 3/13 Date:                 TX#: 4/13   man Grade II patellar mob M/L x 5 min   STM grade II  at medial knee joint x 3      therex  NuStep level 1 x 3 mins   SLR with QS 2 x 5   SL clamshells 2 x 10 R/L  Seated LAQ 2 x 10   Supine marches 2 x 10   Seated hamstring curls 2 x 10 YTB   DKTC OSB 2 x 10   Heel slides with towel, board, DKSA strap 2 x 10   Quad sets with towel x 5            modalities ICE for 8 mins      HEP: Seated LAQ     Charges: 1 man (8 mins), 2 therex (35 mins)      Total Timed Treatment: 43 min  Total Treatment Time: 51 min

## 2023-02-13 ENCOUNTER — OFFICE VISIT (OUTPATIENT)
Dept: PHYSICAL THERAPY | Age: 53
End: 2023-02-13
Payer: MEDICAID

## 2023-02-13 PROCEDURE — 97110 THERAPEUTIC EXERCISES: CPT

## 2023-02-13 NOTE — PROGRESS NOTES
Diagnosis:   Primary osteoarthritis of left knee (M17.12)  Acute pain of left knee (M25.562)  Acute internal derangement of left knee (M23.92)   Referring Provider: Juno Drummond  Date of Evaluation:    2/7/2023    Precautions:  None Next MD visit:   none scheduled  Date of Surgery: n/a   Insurance Primary/Secondary: Barry Angel / N/A     # Auth Visits: POC 13 visits, 13 visits auth until 4/8/2023      Subjective: The patient reports he is not having as much pain in the knee this week as he had at the last session. He has been sitting for the last couple hours resting the knee. yesterday he was having pain with standing more than 5 minutes; this pain was at the anterior knee and into the calves. Pain: 5/10  Current functional limitations include intermittent pain with sleeping,, standing less than 10 mins, walking more than 2 blocks, bending down, intermittent difficulty with stairs (step to step),  difficulty getting in/out sedan cars     Objective: See treatment log     AROM 122 deg (113)       Assessment: Continued with stretches and strengthening this session to hep improve his knee support in CKC. He did have increased knee flexion range which will allow for better range for squatting, stairs and car transfers. He continues to need increased LE strength along with increased range of motion to improve his knee mechanics in CKC and allow him to decrease stresses at the anterior and medial knee in weight bearing.      Goals:   Goals: (to be met in 13 visits)  Patient is independent with progressive HEP to maintain gains made in PT   Patient is able to decrease max pain to 6/10 to decrease limitations with walking and standing  Patient is able to sleep 75% of the nights without knee pain to improve quality of life   Patient is able to improve L hip flexion MMT to 4+/5 to improve strength with walking and standing  Patient is able to reach 15s SLS stance on LLE to improve balance on uneven grounds   Patient is able to able to improve knee flexion range to 120 degrees to improve mobility with bending to  items from the ground    Plan: Continue with progressive range of motion and strengthening; progress with standing hip strengthening, heel raises and standing marches to improve strength and stability in weight bearing. Date: 2/9/2023  TX#: 2/13 Date:    2/13/2023             TX#: 3/13 Date:                 TX#: 4/13   man Grade II patellar mob M/L x 5 min   STM grade II  at medial knee joint x 3  Grade II patellar mob M/L x 3 min   STM grade II  at medial knee joint x 3     therex  NuStep level 1 x 3 mins   SLR with QS 2 x 5   SL clamshells 2 x 10 R/L  Seated LAQ 2 x 10   Supine marches 2 x 10   Seated hamstring curls 2 x 10 YTB   DKTC OSB 2 x 10   Heel slides with towel, board, DKSA strap 2 x 10   Quad sets with towel x 5  NuStep level 4 x 4 mins     Quad sets with towel x 5, 5\"  SLR with QS 1 x 5   Heel slides with towel, board, DKSA strap 1 x 10     SL clamshells x15 R/L    Seated hip adduction isometric x 15, 3\"    Seated LAQ 1 x 10   Seated clams 2 x 10 RTB  Seated marches x15 RTB  Seated knee flexion in chair x 10          NMR      modalities ICE for 8 mins  NA    HEP:  Not progressed this visit.     Charges: 3 ther ex (42 min()  Total Timed Treatment: 42 min  Total Treatment Time: 43 min

## 2023-02-15 ENCOUNTER — OFFICE VISIT (OUTPATIENT)
Dept: PHYSICAL THERAPY | Age: 53
End: 2023-02-15
Payer: MEDICAID

## 2023-02-15 PROCEDURE — 97110 THERAPEUTIC EXERCISES: CPT

## 2023-02-15 NOTE — PROGRESS NOTES
Diagnosis:   Primary osteoarthritis of left knee (M17.12)  Acute pain of left knee (M25.562)  Acute internal derangement of left knee (M23.92)   Referring Provider: Walton Hashimoto  Date of Evaluation:    2/7/2023    Precautions:  None Next MD visit:   none scheduled  Date of Surgery: n/a   Insurance Primary/Secondary: Amina Marsh / N/A     # Auth Visits: POC 13 visits, 13 visits auth until 4/8/2023      Subjective: The patient reports he is having more pain today and he thinks that his pain really increased with the change in weather overnight. He had difficulty with sleep last night. He was ok after the last session until last night. Most of the pain is located at the anterior knee in general today. Pain: 10/10  Current functional limitations include intermittent pain with sleeping, standing less than 10 mins, walking more than 2 blocks, bending down, intermittent difficulty with stairs (step to step),  difficulty getting in/out sedan cars     Objective: See treatment log     AROM 122 deg (122)       Assessment: Continued with strengthening and knee range of motion this session to improve his LE mechanics as needed to improve his gait pattern, transfers and endurance in CK. Continued with proximal hip strengthening and additional weight bearing exercises this session; he had no increased pain over baseline. He continues to need increased strength to help with his functional independence and to decrease stresses at the anterior and medial knee.     Goals:   Goals: (to be met in 13 visits)  Patient is independent with progressive HEP to maintain gains made in PT   Patient is able to decrease max pain to 6/10 to decrease limitations with walking and standing  Patient is able to sleep 75% of the nights without knee pain to improve quality of life   Patient is able to improve L hip flexion MMT to 4+/5 to improve strength with walking and standing  Patient is able to reach 15s SLS stance on LLE to improve balance on uneven grounds   Patient is able to able to improve knee flexion range to 120 degrees to improve mobility with bending to  items from the ground    Plan: Continue with progressive range of motion and strengthening; progress with marches on Airex for strength and balance and also progress with lateral walks to help improve his ability to walk with better mechanics and decreased pain medially.       Date: 2/9/2023  TX#: 2/13 Date:    2/13/2023             TX#: 3/13 Date: 2/15/2023                TX#: 4/13   man Grade II patellar mob M/L x 5 min   STM grade II  at medial knee joint x 3  Grade II patellar mob M/L x 3 min   STM grade II  at medial knee joint x 3 min Grade II patellar mob M/L x 3 min   STM grade II  at medial knee joint x 2 min    therex  NuStep level 1 x 3 mins   SLR with QS 2 x 5   SL clamshells 2 x 10 R/L  Seated LAQ 2 x 10   Supine marches 2 x 10   Seated hamstring curls 2 x 10 YTB   DKTC OSB 2 x 10   Heel slides with towel, board, DKSA strap 2 x 10   Quad sets with towel x 5  NuStep level 4 x 4 mins     Quad sets with towel x 5, 5\"  SLR with QS 1 x 5   Heel slides with towel, board, DKSA strap 1 x 10     SL clamshells x15 R/L    Seated hip adduction isometric x 15, 3\"    Seated LAQ 1 x 10   Seated clams 2 x 10 RTB  Seated marches x15 RTB  Seated knee flexion in chair x 10       NuStep level 4 x 5 mins     Quad sets with towel x 5, 5\"  SLR with QS 1 x 5   Heel slides with towel, board, DKSA strap 1 x 10     SL clamshells x20 R/L    Seated hip adduction isometric x 15, 3\"    Seated LAQ 1 x 10   Seated clams 2 x 10 RTB  Seated marches 2 x 10 RTB  Seated knee flexion in chair x 10 ND  Standing heel raises x 15  Slant stretch level 1, 1 min  standing hip abduction 1 x 10 R/L  Standing hip extension 1 x 10 R/L    NMR      modalities ICE for 8 mins  NA NA   HEP:  reviewed    Charges: 3 ther ex (38 min)  Total Timed Treatment: 38 min  Total Treatment Time: 40 min

## 2023-02-17 ENCOUNTER — TELEPHONE (OUTPATIENT)
Dept: PHYSICAL THERAPY | Facility: HOSPITAL | Age: 53
End: 2023-02-17

## 2023-02-21 ENCOUNTER — OFFICE VISIT (OUTPATIENT)
Dept: PHYSICAL THERAPY | Age: 53
End: 2023-02-21
Payer: MEDICAID

## 2023-02-21 PROCEDURE — 97110 THERAPEUTIC EXERCISES: CPT

## 2023-02-21 NOTE — PROGRESS NOTES
Diagnosis:   Primary osteoarthritis of left knee (M17.12)  Acute pain of left knee (M25.562)  Acute internal derangement of left knee (M23.92)   Referring Provider: Mitali Watson  Date of Evaluation:    2/7/2023    Precautions:  None Next MD visit:   none scheduled  Date of Surgery: n/a   Insurance Primary/Secondary: Estle Spurling / N/A     # Auth Visits: POC 13 visits, 13 visits auth until 4/8/2023      Subjective: The patient pain and burning in both knee joints at the anteromedial aspects causing him to wake up at 2 am, he was unable to return to sleep due to pain. He iced 20 minutes prior to PT session today. He used Ibuprofen 2 hours prior to the session and states it has not been helping with pain management at this time. He reports hip pain that \"comes and goes\". HEP has not been improving his pain. Pain: 20/10  Current functional limitations include intermittent pain with sleeping, standing less than 10 mins, walking more than 2 blocks, bending down, intermittent difficulty with stairs (step to step),  difficulty getting in/out sedan cars     Objective: See treatment log     Assessment: The patient was not having pain relief with manual techniques and the pain was exacerbated with therex. The patient experienced increased pain with extension and CKC activities due to compression on the knee joint reporting 23/10 pain. Bending his knee provided minimal pain relief between repetitions; he required multiple breaks throughout therex for pain management.  Will continue to work on strengthening and increasing ROM as tolerated next visit and plan to progress to HEP pending symptoms and response to treatment    Goals:   Goals: (to be met in 13 visits)  Patient is independent with progressive HEP to maintain gains made in PT   Patient is able to decrease max pain to 6/10 to decrease limitations with walking and standing  Patient is able to sleep 75% of the nights without knee pain to improve quality of life Patient is able to improve L hip flexion MMT to 4+/5 to improve strength with walking and standing  Patient is able to reach 15s SLS stance on LLE to improve balance on uneven grounds   Patient is able to able to improve knee flexion range to 120 degrees to improve mobility with bending to  items from the ground    Plan:  Progress ther ex and HEP with marches on airex and  standing TKEs as tolerated; plan to reassess patient progress next session to determine POC.     Date:    2/13/2023             TX#: 3/13 Date: 2/15/2023                TX#: 4/13 Date: 2/21/2023                TX#: 5/13   man Grade II patellar mob M/L x 3 min   STM grade II  at medial knee joint x 3 min Grade II patellar mob M/L x 3 min   STM grade II  at medial knee joint x 2 min  Grade II patellar mobs M/L x 1 min  STM grade II at medial knee joint x 1 min    therex  NuStep level 4 x 4 mins     Quad sets with towel x 5, 5\"  SLR with QS 1 x 5   Heel slides with towel, board, DKSA strap 1 x 10     SL clamshells x15 R/L    Seated hip adduction isometric x 15, 3\"    Seated LAQ 1 x 10   Seated clams 2 x 10 RTB  Seated marches x15 RTB  Seated knee flexion in chair x 10       NuStep level 4 x 5 mins     Quad sets with towel x 5, 5\"  SLR with QS 1 x 5   Heel slides with towel, board, DKSA strap 1 x 10     SL clamshells x20 R/L    Seated hip adduction isometric x 15, 3\"    Seated LAQ 1 x 10   Seated clams 2 x 10 RTB  Seated marches 2 x 10 RTB  Seated knee flexion in chair x 10 ND  Standing heel raises x 15  Slant stretch level 1, 1 min  standing hip abduction 1 x 10 R/L  Standing hip extension 1 x 10 R/L  NuStep level 4 x 5 mins     Quad sets with towel x 5, 5\"  Heel slides with towel, board, DKSA strap 1 x 10   SLR with QS 1 x 5   SL clamshells x20 L    Seated marches 2 x 10 RTB  Seated clams 2 x 10 RTB  Seated hip adduction isometric x 15, 5\"    Slant stretch level 1, 1 min  Standing heel raises x 10    NMR      modalities NA NA Ice for 10 mins HEP: Reviewed    Charges: 3 ther ex (38 min)  Total Timed Treatment: 40 min  Total Treatment Time: 50 min

## 2023-02-23 ENCOUNTER — APPOINTMENT (OUTPATIENT)
Dept: PHYSICAL THERAPY | Age: 53
End: 2023-02-23
Payer: MEDICAID

## 2023-02-24 ENCOUNTER — OFFICE VISIT (OUTPATIENT)
Dept: PHYSICAL THERAPY | Age: 53
End: 2023-02-24
Payer: MEDICAID

## 2023-02-24 PROCEDURE — 97110 THERAPEUTIC EXERCISES: CPT

## 2023-02-24 NOTE — PROGRESS NOTES
Eric  Pt has attended 6 visits in Physical Therapy. Diagnosis:   Primary osteoarthritis of left knee (M17.12)  Acute pain of left knee (M25.562)  Acute internal derangement of left knee (M23.92)   Referring Provider: Gisela Melendez  Date of Evaluation:    2/7/2023    Precautions:  None Next MD visit:   none scheduled  Date of Surgery: n/a   Insurance Primary/Secondary: Anderson Hansen / N/A     # Auth Visits: POC 13 visits, 13 visits auth until 4/8/2023      Subjective: The patient reports he has been using a brace to prevent buckling since Wednesday. He iced and used Tylenol for pain management yesterday. Regarding current functional limitations, the pain continues to wake him up at night and he is unable to stand longer than 10 minutes without needing a seated rest break. His left knee hurts with walking in small grocery stores and believes he is unable to walk 2 blocks. He uses step-to pattern with stair negotiation and uses railings when able. Getting in/out of sedan cars continues to be difficult due to painful flexion. He states he had a good experience in therapy but his pain levels have remained the same since starting PT. Pain: 10/10  Current functional limitations include intermittent pain with sleeping, standing less than 10 mins, walking more than 2 blocks, bending down, intermittent difficulty with stairs (step to step), difficulty getting in/out sedan cars     Objective: See treatment log   Palpation: mod edema on the medial left knee, TTP on left medial joint line. Min edema on the medial right knee. TTP on right lateral joint line.   Patellar mobility:  No deficits in patellar mobility in left knee     AROM: (* denotes performed with pain)  Knee   Flexion: R 120*; L 113* (113)  Extension: R 3; L  (0) 6*         Flexibility:  Hamstrings: R -23 deg ; L -17 deg    Strength/MMT: (* denotes performed with pain)  Hip Knee Foot/Ankle   Flexion: R 4*/5; L 4*/5  Extension: R 3*/5; L 3+*/5  Abduction: R 4-/5; L 3+*/5  ER: R 4*/5; L 4-*/5  IR: R 4/5; L 4*/5    Low abdominal MMT: 1/5 Flexion: R 4/5; L 4*/5  Extension: R 5/5; L 4+*/5    DF: R 5/5; L 5/5  PF: R 5/5; L 5/5     Special tests:   Desiree's: L: positive for medial mensicus     Gait: pt ambulates on level ground with antalgia, bilateral trendelenburg/waddle, stiff knee gait pattern, R lateral lean, wide BERTHA   Balance: SLS: R 3 sec, L 9 sec    Limited with participation this session due to feeling ill; focused on reassessment. Assessment: The patient has made gains in extension ROM and in BLE strength; however, he has not progressed with any of his activity limitations due to medial knee pain. Pain is preventing him from making gains with gait mechanics and endurance activities. The pain tends to increase with activities promoting meniscal compression. Edema across the medial joint line could also be contributing to the patient's pain and range limitations. The patient is independent with progressive HEP.  The patient will discharge as he has not made any significant gains in skilled physical therapy and needs additional follow up with MD.    Goals:   Goals: (to be met in 13 visits)  Patient is independent with progressive HEP to maintain gains made in PT MET  Patient is able to decrease max pain to 6/10 to decrease limitations with walking and standing WORKING TOWARD  Patient is able to sleep 75% of the nights without knee pain to improve quality of life WORKING TOWARD   Patient is able to improve L hip flexion MMT to 4+/5 to improve strength with walking and standing WORKING TOWARD   Patient is able to reach 15s SLS stance on LLE to improve balance on uneven grounds WORKING TOWARD   Patient is able to able to improve knee flexion range to 120 degrees to improve mobility with bending to  items from the ground WORKING TOWARD     Post LEFS Score  Post LEFS Score: 21.25 % (2/21/2023  3:21 PM)    -1.25 % improvement    Plan: Discharge from skilled PT with HEP. Patient was instructed to follow up with his MD regarding additional care for pain management. Patient/Family/Caregiver was advised of these findings, precautions, and treatment options and has agreed to actively participate in planning and for this course of care. Thank you for your referral. If you have any questions, please contact me at Dept: [phone]. Sincerely,  Electronically signed by therapist: Nehal Ballard PT     Physician's certification required:  Yes  Please co-sign or sign and return this letter via fax as soon as possible to [phone]. I certify the need for these services furnished under this plan of treatment and while under my care.     X___________________________________________________ Date____________________    Certification From: 9/29/2535  To:5/25/2023       Date:    2/13/2023             TX#: 3/13 Date: 2/15/2023                TX#: 4/13 Date: 2/21/2023                TX#: 5/13 Date: 2/24/2023                TX#: 6/13   man Grade II patellar mob M/L x 3 min   STM grade II  at medial knee joint x 3 min Grade II patellar mob M/L x 3 min   STM grade II  at medial knee joint x 2 min  Grade II patellar mobs M/L x 1 min  STM grade II at medial knee joint x 1 min     therex  NuStep level 4 x 4 mins     Quad sets with towel x 5, 5\"  SLR with QS 1 x 5   Heel slides with towel, board, DKSA strap 1 x 10     SL clamshells x15 R/L    Seated hip adduction isometric x 15, 3\"    Seated LAQ 1 x 10   Seated clams 2 x 10 RTB  Seated marches x15 RTB  Seated knee flexion in chair x 10       NuStep level 4 x 5 mins     Quad sets with towel x 5, 5\"  SLR with QS 1 x 5   Heel slides with towel, board, DKSA strap 1 x 10     SL clamshells x20 R/L    Seated hip adduction isometric x 15, 3\"    Seated LAQ 1 x 10   Seated clams 2 x 10 RTB  Seated marches 2 x 10 RTB  Seated knee flexion in chair x 10 ND  Standing heel raises x 15  Slant stretch level 1, 1 min  standing hip abduction 1 x 10 R/L  Standing hip extension 1 x 10 R/L  NuStep level 4 x 5 mins     Quad sets with towel x 5, 5\"  Heel slides with towel, board, DKSA strap 1 x 10   SLR with QS 1 x 5   SL clamshells x20 L    Seated marches 2 x 10 RTB  Seated clams 2 x 10 RTB  Seated hip adduction isometric x 15, 5\"    Slant stretch level 1, 1 min  Standing heel raises x 10  NuStep level 4 x 5 mins     Quad sets with towel x 10, 5\"  Heel slides with towel, board, DKSA strap 1 x 20   SLR with QS 1 x 10   SL clamshells x 20 R/L   Seated LAQ 1 x 20   Standing heel raises x 15  SLB 10\" R/L     NMR       modalities NA NA Ice for 10 mins  NA   HEP: Reviewed    Charges: 2 ther ex (23 min) , 2 mins reassessment Total Timed Treatment: 23 min  Total Treatment Time: 25 min

## 2023-03-15 ENCOUNTER — HOSPITAL ENCOUNTER (OUTPATIENT)
Dept: MRI IMAGING | Age: 53
Discharge: HOME | End: 2023-03-15
Payer: MEDICAID

## 2023-03-15 DIAGNOSIS — M25.562 ACUTE PAIN OF LEFT KNEE: ICD-10-CM

## 2023-03-15 DIAGNOSIS — M23.92 ACUTE INTERNAL DERANGEMENT OF LEFT KNEE: ICD-10-CM

## 2023-03-15 PROCEDURE — 73721 MRI JNT OF LWR EXTRE W/O DYE: CPT

## 2023-03-21 ENCOUNTER — OFFICE VISIT (OUTPATIENT)
Dept: ORTHOPEDICS CLINIC | Facility: CLINIC | Age: 53
End: 2023-03-21

## 2023-03-21 DIAGNOSIS — M25.562 ACUTE PAIN OF LEFT KNEE: ICD-10-CM

## 2023-03-21 DIAGNOSIS — M23.204 DEGENERATIVE TEAR OF MEDIAL MENISCUS, LEFT: Primary | ICD-10-CM

## 2023-03-21 DIAGNOSIS — M23.92 ACUTE INTERNAL DERANGEMENT OF LEFT KNEE: ICD-10-CM

## 2023-03-21 PROCEDURE — 99213 OFFICE O/P EST LOW 20 MIN: CPT | Performed by: ORTHOPAEDIC SURGERY

## 2023-04-07 ENCOUNTER — TELEPHONE (OUTPATIENT)
Dept: ORTHOPEDICS CLINIC | Facility: CLINIC | Age: 53
End: 2023-04-07

## 2023-04-07 NOTE — TELEPHONE ENCOUNTER
Spoke with patient to offer surgery dates. He chose 6/9. He was informed that he must has medical clearance for this surgery.

## 2023-04-07 NOTE — TELEPHONE ENCOUNTER
Type of surgery:  Left knee arthroscopy w/ partial medial meniscectomy  Date: 6/9/23  Location: [facility]  Medical Clearance:      *Medical: Yes      *Dental: No      *Other:  Prior Authorization Status: Pending  Workers Comp:  [place]/Veronica:  [place]: Yes  POV: 6/27/23

## 2023-05-24 ENCOUNTER — TELEPHONE (OUTPATIENT)
Dept: ORTHOPEDICS CLINIC | Facility: CLINIC | Age: 53
End: 2023-05-24

## 2023-05-24 NOTE — TELEPHONE ENCOUNTER
Per Alonso, right knee Virginie [id number] approved from 2/23/2023-11/19/2023, Josephine \[facility] [place] [facility]\"" #[id number].

## 2023-06-02 ENCOUNTER — TELEPHONE (OUTPATIENT)
Dept: CASE MANAGEMENT | Age: 53
End: 2023-06-02

## 2023-06-02 DIAGNOSIS — M23.204 DEGENERATIVE TEAR OF MEDIAL MENISCUS, LEFT: Primary | ICD-10-CM

## 2023-06-02 NOTE — TELEPHONE ENCOUNTER
Good Morning Dr Angela Aquino and staff,    Ref# [id number]    Cased was denied thru patients [address] did not meet medical necessity. DOS 6/9/23    Peer to Peer is available for 7 days. PC phone# [phone], option #4    Ref# [id number]    Please advise once P2P has been completed.     Thank you for your help    [facility]

## 2023-06-06 NOTE — TELEPHONE ENCOUNTER
Called patient and notified him that his insurance denied.   Patient will try to call his insurance tomorrow and call us back

## 2023-06-06 NOTE — TELEPHONE ENCOUNTER
[id number] completed. Left knee arthroscopy surgery denied. Next step would be patient to call his insurance company to see if they will respond better to him.

## 2023-06-06 NOTE — TELEPHONE ENCOUNTER
Dr Rachelle Mg unavailable for peer to peer when they called at 12:45. I rescheduled [id number] with Dr Joseph Holcomb on 6/6/23 at 4:15.

## 2023-06-08 NOTE — TELEPHONE ENCOUNTER
Called and spoke to patient. Patient wants to move forward with a written appeal.    Patient states that he contacted his insurance and they state that they did not receive the MRI information, only Xray. Will forward to Tomasa Mireles in [facility] [address], [facility] and see if this is correct?

## 2023-06-08 NOTE — TELEPHONE ENCOUNTER
Lin Eau Galle -  Patient states that his insurance told him that no MRI information was sent to them, only xray result. Can you please take a look to see if this is accurate. As all imaging should be sent when it comes to surgery.

## 2023-06-12 NOTE — TELEPHONE ENCOUNTER
GOod Afternoon,    I sent email to Lisa Moore with 2 PDFs attached. 1st PDF from 6/12/23 will show the instructions for patient of office to file appeal    2nd PDF is what I uploaded online to [employer] on 5/31/23    I did upload copy of patients MRI results as well as clinical notes from Ortho for their review. Patient was given incorrect info from his insurance company. All imaging is sent for review as long as the physician has reviewed and signed off on it.     Thank you    [facility]  [phone]

## 2023-07-05 ENCOUNTER — HOSPITAL ENCOUNTER (OUTPATIENT)
Dept: GENERAL RADIOLOGY | Facility: HOSPITAL | Age: 53
Discharge: HOME | End: 2023-07-05
Attending: ORTHOPAEDIC SURGERY
Payer: MEDICAID

## 2023-07-05 ENCOUNTER — OFFICE VISIT (OUTPATIENT)
Dept: ORTHOPEDICS CLINIC | Facility: CLINIC | Age: 53
End: 2023-07-05

## 2023-07-05 VITALS
HEIGHT: 74 IN | DIASTOLIC BLOOD PRESSURE: 93 MMHG | SYSTOLIC BLOOD PRESSURE: 156 MMHG | WEIGHT: 236.38 LBS | BODY MASS INDEX: 30.34 KG/M2 | HEART RATE: 79 BPM

## 2023-07-05 DIAGNOSIS — M17.11 PRIMARY OSTEOARTHRITIS OF RIGHT KNEE: ICD-10-CM

## 2023-07-05 DIAGNOSIS — M17.11 PRIMARY OSTEOARTHRITIS OF RIGHT KNEE: Primary | ICD-10-CM

## 2023-07-05 PROCEDURE — 3077F SYST BP >= 140 MM HG: CPT | Performed by: ORTHOPAEDIC SURGERY

## 2023-07-05 PROCEDURE — 99213 OFFICE O/P EST LOW 20 MIN: CPT | Performed by: ORTHOPAEDIC SURGERY

## 2023-07-05 PROCEDURE — 73562 X-RAY EXAM OF KNEE 3: CPT | Performed by: ORTHOPAEDIC SURGERY

## 2023-07-05 PROCEDURE — 3080F DIAST BP >= 90 MM HG: CPT | Performed by: ORTHOPAEDIC SURGERY

## 2023-07-05 PROCEDURE — 3008F BODY MASS INDEX DOCD: CPT | Performed by: ORTHOPAEDIC SURGERY

## 2023-07-05 PROCEDURE — 20610 DRAIN/INJ JOINT/BURSA W/O US: CPT | Performed by: ORTHOPAEDIC SURGERY

## 2023-09-26 ENCOUNTER — OFFICE VISIT (OUTPATIENT)
Dept: ORTHOPEDICS CLINIC | Facility: CLINIC | Age: 53
End: 2023-09-26

## 2023-09-26 DIAGNOSIS — M25.562 ACUTE PAIN OF LEFT KNEE: ICD-10-CM

## 2023-09-26 DIAGNOSIS — M23.204 DEGENERATIVE TEAR OF MEDIAL MENISCUS, LEFT: ICD-10-CM

## 2023-09-26 DIAGNOSIS — M17.12 PRIMARY OSTEOARTHRITIS OF LEFT KNEE: ICD-10-CM

## 2023-09-26 DIAGNOSIS — M23.92 ACUTE INTERNAL DERANGEMENT OF LEFT KNEE: Primary | ICD-10-CM

## 2023-09-26 PROCEDURE — 99213 OFFICE O/P EST LOW 20 MIN: CPT | Performed by: ORTHOPAEDIC SURGERY

## 2023-09-26 NOTE — PROGRESS NOTES
NURSING INTAKE COMMENTS: Patient presents with:  Knee Pain: F/u left knee pain- 5/10 no numbness or tingling. Insurance not covering Sx and is appealing. HPI: This 46year old male presents today to discuss his left knee. I last saw him 3 months ago in June 2023. We went through options of injections, arthroscopy, and even knee replacement. He again stated today that he is not interested in knee replacement. He wants a knee scope. If injections are needed, he wants to wait till after the knee arthroscopy, similar to his right knee. He had a right knee arthroscopy in the past and a month ago my partner injected the right knee with Durolane. It is helping. He wants the same treatment for the left knee but insurance is not approving the arthroscopy. I did a peer to peer for the left knee arthroscopy and it was still denied. Apparently there is a secondary appeals process. The patient said he has called as well. 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 His medical history is unchanged. Past Medical History:   Diagnosis Date    Asthma     Current tear knee, lateral meniscus 06/2020    Right knee    CVA (cerebral vascular accident) ([facility] [address]     3 TIA's in past, most recent     Diabetes [facility])     Essential hypertension     Severe    High cholesterol     LVH (left ventricular hypertrophy)     Mitral regurgitation     mild    Sleep apnea     non compliant with CPAP auto at 10-20cwp     Past Surgical History:   Procedure Laterality Date    CARDIAC CATHETERIZATION  2010    COLONOSCOPY  04/2021    COLONOSCOPY N/A 4/23/2021    Procedure: COLONOSCOPY;  Surgeon: Vergie Primrose, MD;  Location: [address] [place] [address] ENDOSCOPY     Current Outpatient Medications   Medication Sig Dispense Refill    Glucosamine-Chondroitin (GLUCOSAMINE CHONDR COMPLEX OR) Take by mouth.       AMLODIPINE 10 MG Oral Tab TAKE 1 TABLET(10 MG) BY MOUTH DAILY 90 tablet 3    MONTELUKAST 10 MG Oral Tab TAKE 1 TABLET(10 MG) BY MOUTH DAILY 30 tablet 12    simvastatin 10 MG Oral Tab Take 1 tablet (10 mg total) by mouth daily. 90 tablet 3    fluticasone propionate 50 MCG/ACT Nasal Suspension 2 sprays by Each Nare route daily. 1 each 6    METFORMIN  MG Oral Tab TAKE 1 TABLET(500 MG) BY MOUTH TWICE DAILY 60 tablet 0    IRBESARTAN 300 MG Oral Tab TAKE 1 TABLET(300 MG) BY MOUTH DAILY 90 tablet 0    cloNIDine HCl 0.1 MG Oral Tab Take 1 tablet (0.1 mg total) by mouth daily. 90 tablet 3    aspirin 325 MG Oral Tab Take 1 tablet (325 mg total) by mouth daily. 14 tablet 0    OneTouch Delica Lancets 45R Does not apply Misc Test Blood Sugar Three Times Daily. Insulin Dependent Diabetes Type II. Z79.4, E11.9. 300 each 3    Blood Glucose Monitoring Suppl (ONETOUCH ULTRA 2) w/Device Does not apply Kit Test Blood Sugar Three Times Daily. Insulin Dependent Diabetes Type II. Z79.4, E11.9. 1 kit 0    Glucose Blood (ONETOUCH ULTRA) In Vitro Strip Test Blood Sugar Three Times Daily. Insulin Dependent Diabetes Type II. Z79.4, E11.9. 300 each 3    Insulin Pen Needle (PEN NEEDLES 5/16\") 31G X 8 MM Does not apply Misc Use daily 100 each 3    insulin detemir (LEVEMIR FLEXPEN) 100 UNIT/ML Subcutaneous Solution Pen-injector Inject 20 Units into the skin nightly. 3 pen 0    Glucose Blood (ACCU-CHEK ROYER) In Vitro Strip 1 Application by Other route 3 (three) times daily. 300 strip PRN    Blood Glucose Monitoring Suppl (ACCU-CHEK COMPACT PLUS CARE) Does not apply Kit Use as directed 1 kit PRN    Accu-Chek Softclix Lancets Does not apply Misc 1 each by Finger stick route 3 (three) times daily. 100 each PRN    Vitamin C 500 MG Oral Tab Take 1 tablet (500 mg total) by mouth daily. Cod Liver Oil 1000 MG Oral Cap Take 1 capsule by mouth daily.       VENTOLIN  (90 Base) MCG/ACT Inhalation Aero Soln INHALE 1-2 PUFFS BY MOUTH ONCE EVERY 6 HOURS AS NEEDED FOR WHEEZING OR SHORTNESS OF BREATH 18 g 0    FLUTICASONE PROPIONATE 50 MCG/ACT Nasal Suspension USE 2 SPRAYS IN EACH NOSTRIL DAILY 3 Bottle 2     No Known Allergies  Family History   Problem Relation Age of Onset    Hypertension Father     Other (Other) Mother         difficult to arouse after anesthesia     No family Hx of DVT/PE    Social History    Occupational History      Not on file    Tobacco Use      Smoking status: Never      Smokeless tobacco: Never    Vaping Use      Vaping Use: Never used    Substance and Sexual Activity      Alcohol use: Not Currently      Drug use: No      Sexual activity: Not on file       Review of Systems:  GENERAL: feels generally well, no significant weight loss or weight gain  SKIN: no ulcerated or worrisome skin lesions  EYES:denies blurred vision or double vision  HEENT: denies new nasal congestion, sinus pain or ST  LUNGS: denies shortness of breath  CARDIOVASCULAR: denies chest pain  GI: no hematemesis, no worsening heartburn, no diarrhea  : no dysuria, no blood in urine, no difficulty urinating, no incontinence  MUSCULOSKELETAL: no other musculoskeletal complaints other than in HPI  NEURO: no numbness or tingling, no weakness or balance disorder  PSYCHE: no depression or anxiety  HEMATOLOGIC: no hx of blood dyscrasia, no Hx DVT/PE  ENDOCRINE: no thyroid or diabetes issues  ALL/ASTHMA: no new hx of severe allergy or asthma    Physical Examination:    There were no vitals taken for this visit. Constitutional: appears well hydrated, alert and responsive, no acute distress noted  Extremities: Left knee with no asymmetric warmth or effusion. No gross deformity. No pitting edema in the legs. Musculoskeletal: Good motion from 0 to 130 degrees. No instability. Medial joint line tenderness only and no patellofemoral crepitus or pain. No lateral joint line tenderness. Neurological: Normal motor and sensory left lower extremity. Imaging: None taken today. No results found.      Lab Results   Component Value Date    WBC 4.24 12/06/2021    HGB 14.1 12/06/2021     12/06/2021      Lab Results   Component Value Date     (H) 12/06/2021    BUN 13.0 12/06/2021    CREATSERUM 0.91 12/06/2021    GFRNAA 75 06/27/2020    GFRAA 87 06/27/2020        Assessment and Plan:  Diagnoses and all orders for this visit:    Acute internal derangement of left knee    Degenerative tear of medial meniscus, left    Acute pain of left knee    Primary osteoarthritis of left knee        Assessment: Left knee degenerative medial meniscus tear and arthritis. Patient desires arthroscopy for partial medial meniscectomy and debridement. He is not interested in knee replacement currently. Plan: He will call his insurance company and see if he has better luck with them than we would. We will also work on getting approval of the right knee arthroscopy. For now I will see him as needed. Follow Up: No follow-ups on file.     Bandar Cary MD

## 2023-12-03 ENCOUNTER — APPOINTMENT (OUTPATIENT)
Dept: GENERAL RADIOLOGY | Facility: HOSPITAL | Age: 53
End: 2023-12-03
Attending: EMERGENCY MEDICINE
Payer: MEDICAID

## 2023-12-03 ENCOUNTER — HOSPITAL ENCOUNTER (EMERGENCY)
Facility: HOSPITAL | Age: 53
Discharge: HOME | End: 2023-12-03
Attending: EMERGENCY MEDICINE
Payer: MEDICAID

## 2023-12-03 VITALS
WEIGHT: 235.88 LBS | OXYGEN SATURATION: 93 % | TEMPERATURE: 100 F | DIASTOLIC BLOOD PRESSURE: 84 MMHG | SYSTOLIC BLOOD PRESSURE: 153 MMHG | BODY MASS INDEX: 30 KG/M2 | HEART RATE: 92 BPM | RESPIRATION RATE: 18 BRPM

## 2023-12-03 DIAGNOSIS — J11.1 INFLUENZA: Primary | ICD-10-CM

## 2023-12-03 LAB
FLUAV + FLUBV RNA SPEC NAA+PROBE: NEGATIVE
FLUAV + FLUBV RNA SPEC NAA+PROBE: POSITIVE
RSV RNA SPEC NAA+PROBE: NEGATIVE
SARS-COV-2 RNA RESP QL NAA+PROBE: NOT DETECTED

## 2023-12-03 PROCEDURE — 0241U SARS-COV-2/FLU A AND B/RSV BY PCR (GENEXPERT): CPT | Performed by: EMERGENCY MEDICINE

## 2023-12-03 PROCEDURE — 99284 EMERGENCY DEPT VISIT MOD MDM: CPT

## 2023-12-03 PROCEDURE — 71045 X-RAY EXAM CHEST 1 VIEW: CPT | Performed by: EMERGENCY MEDICINE

## 2023-12-03 PROCEDURE — 0241U SARS-COV-2/FLU A AND B/RSV BY PCR (GENEXPERT): CPT

## 2023-12-03 RX ORDER — TRAMADOL HYDROCHLORIDE 50 MG/1
TABLET ORAL EVERY 6 HOURS PRN
Qty: 10 TABLET | Refills: 0 | Status: SHIPPED | OUTPATIENT
Start: 2023-12-03 | End: 2023-12-08

## 2023-12-04 NOTE — ED INITIAL ASSESSMENT (HPI)
X5 days congested cough, body aches and chills, denies sick contacts.   No respiratory distress, low grade fevers (currently 99.6F)

## 2023-12-08 ENCOUNTER — PATIENT MESSAGE (OUTPATIENT)
Dept: ORTHOPEDICS CLINIC | Facility: CLINIC | Age: 53
End: 2023-12-08

## 2024-02-02 ENCOUNTER — HOSPITAL ENCOUNTER (EMERGENCY)
Facility: HOSPITAL | Age: 54
Discharge: HOME | End: 2024-02-03
Attending: EMERGENCY MEDICINE
Payer: MEDICAID

## 2024-02-02 DIAGNOSIS — S76.212A INGUINAL STRAIN, LEFT, INITIAL ENCOUNTER: Primary | ICD-10-CM

## 2024-02-02 PROCEDURE — 99283 EMERGENCY DEPT VISIT LOW MDM: CPT

## 2024-02-02 PROCEDURE — 99284 EMERGENCY DEPT VISIT MOD MDM: CPT

## 2024-02-03 ENCOUNTER — APPOINTMENT (OUTPATIENT)
Dept: GENERAL RADIOLOGY | Facility: HOSPITAL | Age: 54
End: 2024-02-03
Attending: EMERGENCY MEDICINE
Payer: MEDICAID

## 2024-02-03 VITALS
OXYGEN SATURATION: 97 % | TEMPERATURE: 98 F | SYSTOLIC BLOOD PRESSURE: 164 MMHG | HEART RATE: 78 BPM | RESPIRATION RATE: 18 BRPM | DIASTOLIC BLOOD PRESSURE: 72 MMHG

## 2024-02-03 PROCEDURE — 72170 X-RAY EXAM OF PELVIS: CPT | Performed by: EMERGENCY MEDICINE

## 2024-02-03 NOTE — ED INITIAL ASSESSMENT (HPI)
Patient presents to ED with days of left groin pain that started with unknown etiology and was improving with ambulation but now has become persistent. Denies any redness or indication of infection denies radiating pain into the testicles.

## 2024-02-04 NOTE — ED PROVIDER NOTES
Patient Seen in: [facility] Emergency Department    History     Chief Complaint   Patient presents with    Groin Pain       HPI    The patient presents to the ED complaining of left groin pain for the past 2 days.  Mild yesterday, worse today.  He states pain is worse with certain movements or walking.  Denies any known trauma.  Denies testicular pain, denies abdominal pain.    History reviewed.   Past Medical History:   Diagnosis Date    Asthma     Current tear knee, lateral meniscus 06/2020    Right knee    CVA (cerebral vascular accident) ([facility])     3 TIA's in past, most recent     Diabetes ([facility])     Essential hypertension     Severe    High cholesterol     LVH (left ventricular hypertrophy)     Mitral regurgitation     mild    Sleep apnea     non compliant with CPAP auto at 10-20cwp       History reviewed.   Past Surgical History:   Procedure Laterality Date    CARDIAC CATHETERIZATION  2010    COLONOSCOPY  04/2021    COLONOSCOPY N/A 4/23/2021    Procedure: COLONOSCOPY;  Surgeon: Adalberto Russell MD;  Location: [facility] ENDOSCOPY         Medications :  (Not in a hospital admission)       Family History   Problem Relation Age of Onset    Hypertension Father     Other (Other) Mother         difficult to arouse after anesthesia       Smoking Status:   Social History     Socioeconomic History    Marital status:    Tobacco Use    Smoking status: Never    Smokeless tobacco: Never   Vaping Use    Vaping Use: Never used   Substance and Sexual Activity    Alcohol use: Yes    Drug use: No       Constitutional and vital signs reviewed.      Social History and Family History elements reviewed from today, pertinent positives to the presenting problem noted.    Physical Exam     ED Triage Vitals   BP 02/02/24 2207 (!) 184/87   Pulse 02/02/24 2207 86   Resp 02/02/24 2207 20   Temp 02/02/24 2207 98.3 °F (36.8 °C)   Temp src --    SpO2 02/02/24 2207 93 %   O2 Device 02/03/24 0043 None (Room air)       All  measures to prevent infection transmission during my interaction with the patient were taken. The patient was already wearing a droplet mask on my arrival to the room. Personal protective equipment was worn throughout the duration of the exam.  Handwashing was performed prior to and after the exam.  Stethoscope and any equipment used during my examination was cleaned with super sani-cloth germicidal wipes following the exam.     Physical Exam  Vitals and nursing note reviewed.   Constitutional:       General: He is not in acute distress.     Appearance: He is well-developed.   HENT:      Head: Normocephalic and atraumatic.   Eyes:      General:         Right eye: No discharge.         Left eye: No discharge.      Conjunctiva/sclera: Conjunctivae normal.   Neck:      Trachea: No tracheal deviation.   Cardiovascular:      Rate and Rhythm: Normal rate.   Pulmonary:      Effort: Pulmonary effort is normal. No respiratory distress.      Breath sounds: No stridor.   Abdominal:      General: There is no distension.      Palpations: Abdomen is soft.   Musculoskeletal:         General: Tenderness present. No deformity.      Comments: Tenderness to the medial left upper thigh near the inguinal ligament.   Skin:     General: Skin is warm and dry.   Neurological:      Mental Status: He is alert and oriented to person, place, and time.   Psychiatric:         Mood and Affect: Mood normal.         Behavior: Behavior normal.         ED Course      Labs Reviewed - No data to display    As Interpreted by me    Imaging Results Available and Reviewed while in ED: XR PELVIS (1 VIEW) (CPT=72170)    Result Date: 2/3/2024  CONCLUSION:   No acute fracture or dislocation.  Mild bilateral hip osteoarthrosis.  No significant change when compared to the preliminary radiology report from [facility].      Dictated by (CST): Jaime Whipple MD on 2/03/2024 at 7:16 AM     Finalized by (CST): Jaime Whipple MD on 2/03/2024 at 7:18 AM          ED Medications Administered: Medications - No data to display      [facility]     Vitals:    02/02/24 2207 02/03/24 0043   BP: (!) 184/87 (!) 164/72   Pulse: 86 78   Resp: 20 18   Temp: 98.3 °F (36.8 °C)    SpO2: 93% 97%     *I personally reviewed and interpreted all ED vitals.    Pulse Ox: 97%, Room air, Normal       Differential Diagnosis/ Diagnostic Considerations: Groin strain, hip arthritis, fracture, other    Complicating Factors: The patient already has does not have any pertinent problems on file. to contribute to the complexity of this ED evaluation.    Medical Decision Making  Patient presents to the ED with symptoms of a left groin strain.  Pelvis x-ray without abnormality, no arthritis noted.  Patient advised on supportive care and stable for discharge.    Problems Addressed:  Inguinal strain, left, initial encounter: acute illness or injury    Amount and/or Complexity of Data Reviewed  Radiology: ordered and independent interpretation performed. Decision-making details documented in ED Course.     Details: I personally reviewed the patient's pelvis x-ray image and noted no fracture or arthritis    Risk  OTC drugs.        Condition upon leaving the department: Stable    Disposition and Plan     Clinical Impression:  1. Inguinal strain, left, initial encounter        Disposition:  Discharge    Follow-up:  Rajesh Sharma MD  [address] [place] [address]  [address] [place] [id number]  [phone]    Schedule an appointment as soon as possible for a visit in 3 day(s)        Medications Prescribed:  Discharge Medication List as of 2/3/2024 12:31 AM

## 2024-02-15 ENCOUNTER — PATIENT MESSAGE (OUTPATIENT)
Dept: ORTHOPEDICS CLINIC | Facility: CLINIC | Age: 54
End: 2024-02-15

## 2024-02-15 NOTE — TELEPHONE ENCOUNTER
From: Phani Griffith  To: Joe Smith  Sent: 2/15/2024 4:35 PM CST  Subject: Left knee     Still haven't received a response from you or the insurance company yet about my left knee surgery. Last spoke with someone from your office back in December.

## 2024-05-11 ENCOUNTER — APPOINTMENT (OUTPATIENT)
Dept: GENERAL RADIOLOGY | Facility: HOSPITAL | Age: 54
End: 2024-05-11

## 2024-05-11 ENCOUNTER — HOSPITAL ENCOUNTER (EMERGENCY)
Facility: HOSPITAL | Age: 54
Discharge: HOME | End: 2024-05-11
Attending: EMERGENCY MEDICINE

## 2024-05-11 VITALS
HEART RATE: 96 BPM | HEIGHT: 74 IN | WEIGHT: 235 LBS | BODY MASS INDEX: 30.16 KG/M2 | SYSTOLIC BLOOD PRESSURE: 167 MMHG | TEMPERATURE: 99 F | OXYGEN SATURATION: 94 % | RESPIRATION RATE: 19 BRPM | DIASTOLIC BLOOD PRESSURE: 96 MMHG

## 2024-05-11 DIAGNOSIS — R05.9 COUGH, UNSPECIFIED TYPE: Primary | ICD-10-CM

## 2024-05-11 PROCEDURE — 99284 EMERGENCY DEPT VISIT MOD MDM: CPT

## 2024-05-11 PROCEDURE — 71045 X-RAY EXAM CHEST 1 VIEW: CPT

## 2024-05-11 PROCEDURE — 99283 EMERGENCY DEPT VISIT LOW MDM: CPT

## 2024-05-11 NOTE — ED PROVIDER NOTES
Patient Seen in: [facility]         EMERGENCY DEPARTMENT NOTE    Dictated. Voice Transcription software has been utilized for this dictation (the reader should be aware that typographical errors are possible with voice transcription software and to please contact the dictating physician if there are questions.)         History     Chief Complaint   Patient presents with    Cough/URI       There may be discrepancies from triage note.     HPI    History provided by patient.  53-year-old male, history as mentioned below complaining of cough for 4 days.  No recent travel.  No sick contacts.  No chest pain, shortness of breath.  No measured temperatures at home.      No fevers, chills, nausea, vomiting, diarrhea, constipation,, urinary complaints.  No chest pain, shortness of breath  No headache, neck pain, neck stiffness, incontinence.  No changes in mentation, no changes in vision, no total/new extremity weakness, no total/new extremity paresthesia, no difficulty speaking.  No alleviating or exacerbating factors.  Denies orthopnea, pnd, change in exercise tolerance limited by chest pain/sob , lower extremity edema/asymmetry.       History reviewed.   Past Medical History:    Asthma ([facility])    Current tear knee, lateral meniscus    Right knee    CVA (cerebral vascular accident) ([facility])    3 TIA's in past, most recent     Diabetes ([facility])    Essential hypertension    Severe    High cholesterol    LVH (left ventricular hypertrophy)    Mitral regurgitation    mild    Sleep apnea    non compliant with CPAP auto at 10-20cwp       History reviewed.   Past Surgical History:   Procedure Laterality Date    Cardiac catheterization  2010    Colonoscopy  04/2021    Colonoscopy N/A 4/23/2021    Procedure: COLONOSCOPY;  Surgeon: Adalberto Russell MD;  Location: [facility] ENDOSCOPY         Medications :  (Not in a hospital admission)       Family History   Problem Relation Age of Onset    Hypertension Father     Other (Other) Mother          difficult to arouse after anesthesia       Smoking Status:   Social History     Socioeconomic History    Marital status:    Tobacco Use    Smoking status: Never    Smokeless tobacco: Never   Vaping Use    Vaping status: Never Used   Substance and Sexual Activity    Alcohol use: Yes    Drug use: No       Review of Systems   Constitutional: Negative.    HENT: Negative.     Eyes: Negative.    Respiratory:  Positive for cough.    Cardiovascular: Negative.    Gastrointestinal: Negative.    Genitourinary: Negative.    Musculoskeletal: Negative.    Skin: Negative.    Neurological: Negative.    Endo/Heme/Allergies: Negative.    Psychiatric/Behavioral: Negative.     All other systems reviewed and are negative.    Pertinent positives as listed.  All other organ systems are reviewed and are negative.    Constitutional and vital signs reviewed.      Social History and Family History elements reviewed from today, pertinent positives to the presenting problem noted.    Physical Exam     ED Triage Vitals [05/11/24 1352]   BP (!) 161/82   Pulse 102   Resp 22   Temp 97.8 °F (36.6 °C)   Temp src Temporal   SpO2 95 %   O2 Device None (Room air)       All measures to prevent infection transmission during my interaction with the patient were taken. The patient was already wearing a droplet mask on my arrival to the room. Personal protective equipment including droplet mask, eye protection, and gloves were worn throughout the duration of the exam.  Handwashing was performed prior to and after the exam.  Stethoscope and any equipment used during my examination was cleaned with super sani-cloth germicidal wipes following the exam.     Physical Exam  Vitals and nursing note reviewed.   Constitutional:       General: He is not in acute distress.     Appearance: He is not ill-appearing or toxic-appearing.   Cardiovascular:      Rate and Rhythm: Normal rate and regular rhythm.      Comments: Dorsalis pedis pulses 2+  bilaterally    Pulmonary:      Effort: Pulmonary effort is normal. No respiratory distress.      Breath sounds: No stridor. No wheezing, rhonchi or rales.      Comments: Speaks in full sentences, no tachypnea, saturating greater than 95% on room air.   Dry cough during interview  Chest:      Chest wall: No tenderness.   Abdominal:      General: There is no distension.      Palpations: Abdomen is soft.      Tenderness: There is no abdominal tenderness. There is no guarding or rebound.   Musculoskeletal:      Right lower leg: No edema.      Left lower leg: No edema.   Skin:     Capillary Refill: Capillary refill takes less than 2 seconds.   Neurological:      General: No focal deficit present.      Mental Status: He is alert and oriented to person, place, and time.      Comments: Gross motor and sensory function intact symmetrically and bilaterally to upper extremities and lower extremities.   Psychiatric:         Mood and Affect: Mood normal.         Behavior: Behavior normal.           Review of prior notes in Care everywhere/Epic performed by myself:  Patient had an echocardiogram completed November 2014 with systolic function that was normal.  55 to 60%.        ED Course     If labs obtained, they are personally reviewed by myself:   Labs Reviewed - No data to display    If radiologic studies ordered during today's ER visit, my independent interpretation are seen directly below.  This is awaiting the radiologist's final interpretation.  Chest X-ray, independent interpretation completed by myself and awaiting formal radiology read:  No acute cardiopulmonary process, no obvious mass       Imaging Results read by radiology in ED: XR CHEST AP PORTABLE  (CPT=71045)    Result Date: 5/11/2024  CONCLUSION:  1. No acute cardiopulmonary finding.    Dictated by (CST): Flynn Cruz MD on 5/11/2024 at 2:26 PM     Finalized by (CST): Flynn Cruz MD on 5/11/2024 at 2:27 PM               ED Medications Administered:  Medications - No data to display        Vitals:    05/11/24 1352   BP: (!) 161/82   Pulse: 102   Resp: 22   Temp: 97.8 °F (36.6 °C)   TempSrc: Temporal   SpO2: 95%   Weight: 106.6 kg   Height: 188 cm (6' 2\")     *I personally reviewed and interpreted all ED vitals.    Pulse Ox interpretation by myself: 95%, Room air, Normal     Monitor Interpretation by myself:   normal sinus rhythm    If Ekg obtained during today's visit, it is independently interpreted by myself directly below:      Medical Record Review: I personally reviewed available prior medical records for any recent pertinent discharge summaries, testing, and procedures and reviewed those reports.      [facility]     Medical decision making/ED Course:   53-year-old male complaining of cough for 3 to 4 days, no chest pain/shortness of breath.  Clear lungs, saturating normally, no distress, dry cough noted on exam with no lower extremity swelling.  Symptoms seem most consistent with viral lower respiratory tract infection.  Supportive care instruction provided.  Strict return instructions given..  Tuberculosis, pneumothorax, pneumonia,pe, CHF, copd, among other life-threatening medical conditions considered and seems unlikely given patient's history, exam, and appearance.  Strict return instructions given.  .cpp1  Patient is non toxic appearing, is in no distress, hemodynamically stable.  Pt agrees and is aware of plan.         Differential Diagnosis:  as listed above in medical decision making.   *Please note that in the presenting to the emergency department, illness/injury that poses a threat to life or function is considered during this patient's initial evaluation.    The complexity of this visit is therefore inherently more complex given the need to consider life threatening pathology prior to any other etiology for this patient's visit.    The differential diagnosis and medical decision above exemplify this rationale.       Medical Decision Making  Problems  Addressed:  Cough, unspecified type: acute illness or injury    Amount and/or Complexity of Data Reviewed  External Data Reviewed: ECG and notes.  Radiology: ordered and independent interpretation performed. Decision-making details documented in ED Course.               Vitals:    05/11/24 1352   BP: (!) 161/82   Pulse: 102   Resp: 22   Temp: 97.8 °F (36.6 °C)   TempSrc: Temporal   SpO2: 95%   Weight: 106.6 kg   Height: 188 cm (6' 2\")             Complicating Factors: Significant medical problems that contribute to the complexity of this emergency room evaluation is listed above.    Condition upon leaving the department: Stable    Disposition and Plan     Clinical Impression:  1. Cough, unspecified type        Disposition:  Discharge    Medications Prescribed:  Current Discharge Medication List          I have discussed the discharge plan with the patient and/or family or well wisher present in the room with the patient's permission.  They state that they understand and agree with the plan.  All questions regarding their care have been answered prior to discharge.  They are aware: Emergency Department is not intended to be a substitute for an effort to provide complete medical care. The imaging, if any, have often been interpreted on a preliminary basis pending final reading by the radiologist.  Instructed to return immediately to the ED if any changes or worsening of condition should occur.  If patient's blood pressure was greater than 140/90 today, patient encouraged to have this blood pressure rechecked with primary MD and blood pressure education provided.

## 2024-05-11 NOTE — ED INITIAL ASSESSMENT (HPI)
Pt to ED with c/o strong cough for about 4 days. Denies fever. Hx of asthma. Pt able to speak in full sentences. No respiratory distress noted. Pt is alert and oriented x4.

## 2024-05-11 NOTE — DISCHARGE INSTRUCTIONS
Return to emergency department for any worsening symptoms including but not limited to weakness, numbness, changes in mentation.  Your symptoms seem most consistent with a viral illness.  Please drink plenty of fluids and take plenty of rest.  Follow with your primary care provider in the next  few days for reevaluation.    The Emergency Department is not intended to be a substitute for an effort to provide complete medical care. The imaging, if any, have often been interpreted on a preliminary basis pending final reading by the radiologist. If your blood pressure was greater than 140/90, please have this blood pressure rechecked by your primary care provider in the next several days.
